# Patient Record
Sex: MALE | Race: OTHER | Employment: FULL TIME | URBAN - METROPOLITAN AREA
[De-identification: names, ages, dates, MRNs, and addresses within clinical notes are randomized per-mention and may not be internally consistent; named-entity substitution may affect disease eponyms.]

---

## 2023-11-16 ENCOUNTER — LAB ENCOUNTER (OUTPATIENT)
Dept: LAB | Facility: HOSPITAL | Age: 43
End: 2023-11-16
Attending: INTERNAL MEDICINE
Payer: COMMERCIAL

## 2023-11-16 ENCOUNTER — OFFICE VISIT (OUTPATIENT)
Dept: INTERNAL MEDICINE CLINIC | Facility: CLINIC | Age: 43
End: 2023-11-16
Payer: COMMERCIAL

## 2023-11-16 VITALS
OXYGEN SATURATION: 97 % | SYSTOLIC BLOOD PRESSURE: 138 MMHG | HEART RATE: 95 BPM | HEIGHT: 69.5 IN | WEIGHT: 216 LBS | TEMPERATURE: 98 F | BODY MASS INDEX: 31.27 KG/M2 | DIASTOLIC BLOOD PRESSURE: 70 MMHG

## 2023-11-16 DIAGNOSIS — Z13.1 DIABETES MELLITUS SCREENING: ICD-10-CM

## 2023-11-16 DIAGNOSIS — Z00.00 ENCOUNTER FOR PREVENTATIVE ADULT HEALTH CARE EXAMINATION: Primary | ICD-10-CM

## 2023-11-16 DIAGNOSIS — Z00.00 ENCOUNTER FOR PREVENTATIVE ADULT HEALTH CARE EXAMINATION: ICD-10-CM

## 2023-11-16 LAB
ALBUMIN SERPL-MCNC: 4.6 G/DL (ref 3.2–4.8)
ALBUMIN/GLOB SERPL: 1.5 {RATIO} (ref 1–2)
ALP LIVER SERPL-CCNC: 61 U/L
ALT SERPL-CCNC: 41 U/L
ANION GAP SERPL CALC-SCNC: 10 MMOL/L (ref 0–18)
AST SERPL-CCNC: 29 U/L (ref ?–34)
BASOPHILS # BLD AUTO: 0.07 X10(3) UL (ref 0–0.2)
BASOPHILS NFR BLD AUTO: 0.9 %
BILIRUB SERPL-MCNC: 0.3 MG/DL (ref 0.3–1.2)
BUN BLD-MCNC: 16 MG/DL (ref 9–23)
BUN/CREAT SERPL: 16.5 (ref 10–20)
CALCIUM BLD-MCNC: 9.6 MG/DL (ref 8.7–10.4)
CHLORIDE SERPL-SCNC: 103 MMOL/L (ref 98–112)
CHOLEST SERPL-MCNC: 232 MG/DL (ref ?–200)
CO2 SERPL-SCNC: 27 MMOL/L (ref 21–32)
CREAT BLD-MCNC: 0.97 MG/DL
DEPRECATED RDW RBC AUTO: 40.2 FL (ref 35.1–46.3)
EGFRCR SERPLBLD CKD-EPI 2021: 101 ML/MIN/1.73M2 (ref 60–?)
EOSINOPHIL # BLD AUTO: 0.2 X10(3) UL (ref 0–0.7)
EOSINOPHIL NFR BLD AUTO: 2.5 %
ERYTHROCYTE [DISTWIDTH] IN BLOOD BY AUTOMATED COUNT: 12.3 % (ref 11–15)
EST. AVERAGE GLUCOSE BLD GHB EST-MCNC: 134 MG/DL (ref 68–126)
FASTING PATIENT LIPID ANSWER: NO
FASTING STATUS PATIENT QL REPORTED: NO
GLOBULIN PLAS-MCNC: 3.1 G/DL (ref 2.8–4.4)
GLUCOSE BLD-MCNC: 106 MG/DL (ref 70–99)
HBA1C MFR BLD: 6.3 % (ref ?–5.7)
HCT VFR BLD AUTO: 44 %
HDLC SERPL-MCNC: 50 MG/DL (ref 40–59)
HGB BLD-MCNC: 15.2 G/DL
IMM GRANULOCYTES # BLD AUTO: 0.02 X10(3) UL (ref 0–1)
IMM GRANULOCYTES NFR BLD: 0.3 %
LDLC SERPL CALC-MCNC: 116 MG/DL (ref ?–100)
LYMPHOCYTES # BLD AUTO: 2.72 X10(3) UL (ref 1–4)
LYMPHOCYTES NFR BLD AUTO: 34.1 %
MCH RBC QN AUTO: 31.1 PG (ref 26–34)
MCHC RBC AUTO-ENTMCNC: 34.5 G/DL (ref 31–37)
MCV RBC AUTO: 90.2 FL
MONOCYTES # BLD AUTO: 0.64 X10(3) UL (ref 0.1–1)
MONOCYTES NFR BLD AUTO: 8 %
NEUTROPHILS # BLD AUTO: 4.33 X10 (3) UL (ref 1.5–7.7)
NEUTROPHILS # BLD AUTO: 4.33 X10(3) UL (ref 1.5–7.7)
NEUTROPHILS NFR BLD AUTO: 54.2 %
NONHDLC SERPL-MCNC: 182 MG/DL (ref ?–130)
OSMOLALITY SERPL CALC.SUM OF ELEC: 292 MOSM/KG (ref 275–295)
PLATELET # BLD AUTO: 214 10(3)UL (ref 150–450)
POTASSIUM SERPL-SCNC: 4 MMOL/L (ref 3.5–5.1)
PROT SERPL-MCNC: 7.7 G/DL (ref 5.7–8.2)
RBC # BLD AUTO: 4.88 X10(6)UL
SODIUM SERPL-SCNC: 140 MMOL/L (ref 136–145)
TRIGL SERPL-MCNC: 381 MG/DL (ref 30–149)
VLDLC SERPL CALC-MCNC: 68 MG/DL (ref 0–30)
WBC # BLD AUTO: 8 X10(3) UL (ref 4–11)

## 2023-11-16 PROCEDURE — 80053 COMPREHEN METABOLIC PANEL: CPT

## 2023-11-16 PROCEDURE — 99386 PREV VISIT NEW AGE 40-64: CPT | Performed by: INTERNAL MEDICINE

## 2023-11-16 PROCEDURE — 85025 COMPLETE CBC W/AUTO DIFF WBC: CPT | Performed by: INTERNAL MEDICINE

## 2023-11-16 PROCEDURE — 83036 HEMOGLOBIN GLYCOSYLATED A1C: CPT

## 2023-11-16 PROCEDURE — 3075F SYST BP GE 130 - 139MM HG: CPT | Performed by: INTERNAL MEDICINE

## 2023-11-16 PROCEDURE — 3008F BODY MASS INDEX DOCD: CPT | Performed by: INTERNAL MEDICINE

## 2023-11-16 PROCEDURE — 80061 LIPID PANEL: CPT

## 2023-11-16 PROCEDURE — 3078F DIAST BP <80 MM HG: CPT | Performed by: INTERNAL MEDICINE

## 2023-11-16 PROCEDURE — 36415 COLL VENOUS BLD VENIPUNCTURE: CPT | Performed by: INTERNAL MEDICINE

## 2023-11-16 RX ORDER — HYDROCORTISONE 25 MG/G
1 CREAM TOPICAL 2 TIMES DAILY PRN
Qty: 1 EACH | Refills: 1 | Status: SHIPPED | OUTPATIENT
Start: 2023-11-16

## 2023-11-16 RX ORDER — HYDROCORTISONE ACETATE 25 MG/1
25 SUPPOSITORY RECTAL DAILY PRN
Qty: 10 SUPPOSITORY | Refills: 1 | Status: SHIPPED | OUTPATIENT
Start: 2023-11-16

## 2023-11-16 RX ORDER — HYDROCORTISONE 25 MG/G
1 CREAM TOPICAL 2 TIMES DAILY PRN
Qty: 1 EACH | Refills: 1 | Status: SHIPPED | OUTPATIENT
Start: 2023-11-16 | End: 2023-11-16

## 2023-11-21 DIAGNOSIS — Z13.1 DIABETES MELLITUS SCREENING: Primary | ICD-10-CM

## 2023-11-21 DIAGNOSIS — E78.5 HYPERLIPIDEMIA, UNSPECIFIED HYPERLIPIDEMIA TYPE: ICD-10-CM

## 2023-11-22 ENCOUNTER — TELEPHONE (OUTPATIENT)
Dept: INTERNAL MEDICINE CLINIC | Facility: CLINIC | Age: 43
End: 2023-11-22

## 2023-11-22 NOTE — TELEPHONE ENCOUNTER
LMTCB      ----- Message from Gracie Truong MD sent at 11/21/2023  1:53 PM CST -----  Please inform patient that his labs have been reviewed. His lipid panel was notable for elevated triglycerides as well as LDL. Hemoglobin A1c is 6.3. Please inform him that he is on the verge of being close to diagnosis of diabetes. Kidney and liver function are normal.  Complete blood count normal.    LDL (low-density lipoprotein) sometimes called \"bad\" cholesterol makes up the majority of your body's cholesterol. High levels of LDL raise your risk of cardiovascular disease including heart disease and stroke. Diets high in saturated fats, salts and cholesterol such as fatty meats, processed foods, dairy and cured meats can lead to elevated LDL levels. Triglycerides are a type of fat (lipid) found in your blood. Recommendations to lower triglycerides include avoiding sugars, refined carbohydrates and alcohol. Increasing foods higher in omega-3 such as fish, dark leafy green vegetables. Increase consumption of vegetables, fruits, herbs, nuts, beans and whole grains. Decrease intake of saturated fats, processed foods, meats and sweets. Moderate amounts of dairy, poultry and seafood. I have placed an order for him to repeat lipid panel hemoglobin A1c prior to next visit. We should recheck this within the next 6 months. Please have him focus on diet and lifestyle changes.     PQ

## 2024-08-07 ENCOUNTER — LAB ENCOUNTER (OUTPATIENT)
Dept: LAB | Facility: HOSPITAL | Age: 44
End: 2024-08-07
Attending: INTERNAL MEDICINE
Payer: COMMERCIAL

## 2024-08-07 ENCOUNTER — OFFICE VISIT (OUTPATIENT)
Dept: INTERNAL MEDICINE CLINIC | Facility: CLINIC | Age: 44
End: 2024-08-07
Payer: COMMERCIAL

## 2024-08-07 VITALS
WEIGHT: 216.38 LBS | DIASTOLIC BLOOD PRESSURE: 76 MMHG | HEIGHT: 69.5 IN | SYSTOLIC BLOOD PRESSURE: 112 MMHG | RESPIRATION RATE: 18 BRPM | BODY MASS INDEX: 31.33 KG/M2 | HEART RATE: 77 BPM | OXYGEN SATURATION: 99 %

## 2024-08-07 DIAGNOSIS — K64.9 HEMORRHOIDS, UNSPECIFIED HEMORRHOID TYPE: Primary | ICD-10-CM

## 2024-08-07 DIAGNOSIS — Z00.00 PREVENTATIVE HEALTH CARE: ICD-10-CM

## 2024-08-07 DIAGNOSIS — R73.03 PREDIABETES: ICD-10-CM

## 2024-08-07 LAB
ALBUMIN SERPL-MCNC: 4.8 G/DL (ref 3.2–4.8)
ALBUMIN/GLOB SERPL: 1.5 {RATIO} (ref 1–2)
ALP LIVER SERPL-CCNC: 59 U/L
ALT SERPL-CCNC: 44 U/L
ANION GAP SERPL CALC-SCNC: 9 MMOL/L (ref 0–18)
AST SERPL-CCNC: 36 U/L (ref ?–34)
BASOPHILS # BLD AUTO: 0.04 X10(3) UL (ref 0–0.2)
BASOPHILS NFR BLD AUTO: 0.7 %
BILIRUB SERPL-MCNC: 0.6 MG/DL (ref 0.3–1.2)
BUN BLD-MCNC: 13 MG/DL (ref 9–23)
BUN/CREAT SERPL: 14.8 (ref 10–20)
CALCIUM BLD-MCNC: 9.7 MG/DL (ref 8.7–10.4)
CHLORIDE SERPL-SCNC: 103 MMOL/L (ref 98–112)
CO2 SERPL-SCNC: 27 MMOL/L (ref 21–32)
CREAT BLD-MCNC: 0.88 MG/DL
DEPRECATED RDW RBC AUTO: 39.8 FL (ref 35.1–46.3)
EGFRCR SERPLBLD CKD-EPI 2021: 110 ML/MIN/1.73M2 (ref 60–?)
EOSINOPHIL # BLD AUTO: 0.08 X10(3) UL (ref 0–0.7)
EOSINOPHIL NFR BLD AUTO: 1.4 %
ERYTHROCYTE [DISTWIDTH] IN BLOOD BY AUTOMATED COUNT: 12.1 % (ref 11–15)
EST. AVERAGE GLUCOSE BLD GHB EST-MCNC: 134 MG/DL (ref 68–126)
FASTING STATUS PATIENT QL REPORTED: NO
GLOBULIN PLAS-MCNC: 3.1 G/DL (ref 2–3.5)
GLUCOSE BLD-MCNC: 109 MG/DL (ref 70–99)
HBA1C MFR BLD: 6.3 % (ref ?–5.7)
HCT VFR BLD AUTO: 44.8 %
HGB BLD-MCNC: 15.7 G/DL
IMM GRANULOCYTES # BLD AUTO: 0.01 X10(3) UL (ref 0–1)
IMM GRANULOCYTES NFR BLD: 0.2 %
LYMPHOCYTES # BLD AUTO: 1.82 X10(3) UL (ref 1–4)
LYMPHOCYTES NFR BLD AUTO: 31 %
MCH RBC QN AUTO: 31.5 PG (ref 26–34)
MCHC RBC AUTO-ENTMCNC: 35 G/DL (ref 31–37)
MCV RBC AUTO: 90 FL
MONOCYTES # BLD AUTO: 0.48 X10(3) UL (ref 0.1–1)
MONOCYTES NFR BLD AUTO: 8.2 %
NEUTROPHILS # BLD AUTO: 3.45 X10 (3) UL (ref 1.5–7.7)
NEUTROPHILS # BLD AUTO: 3.45 X10(3) UL (ref 1.5–7.7)
NEUTROPHILS NFR BLD AUTO: 58.5 %
OSMOLALITY SERPL CALC.SUM OF ELEC: 289 MOSM/KG (ref 275–295)
PLATELET # BLD AUTO: 217 10(3)UL (ref 150–450)
POTASSIUM SERPL-SCNC: 3.8 MMOL/L (ref 3.5–5.1)
PROT SERPL-MCNC: 7.9 G/DL (ref 5.7–8.2)
RBC # BLD AUTO: 4.98 X10(6)UL
SODIUM SERPL-SCNC: 139 MMOL/L (ref 136–145)
WBC # BLD AUTO: 5.9 X10(3) UL (ref 4–11)

## 2024-08-07 PROCEDURE — 3008F BODY MASS INDEX DOCD: CPT | Performed by: INTERNAL MEDICINE

## 2024-08-07 PROCEDURE — 83036 HEMOGLOBIN GLYCOSYLATED A1C: CPT | Performed by: INTERNAL MEDICINE

## 2024-08-07 PROCEDURE — 3074F SYST BP LT 130 MM HG: CPT | Performed by: INTERNAL MEDICINE

## 2024-08-07 PROCEDURE — 3078F DIAST BP <80 MM HG: CPT | Performed by: INTERNAL MEDICINE

## 2024-08-07 PROCEDURE — 36415 COLL VENOUS BLD VENIPUNCTURE: CPT | Performed by: INTERNAL MEDICINE

## 2024-08-07 PROCEDURE — 85025 COMPLETE CBC W/AUTO DIFF WBC: CPT | Performed by: INTERNAL MEDICINE

## 2024-08-07 PROCEDURE — 80053 COMPREHEN METABOLIC PANEL: CPT

## 2024-08-07 PROCEDURE — 99214 OFFICE O/P EST MOD 30 MIN: CPT | Performed by: INTERNAL MEDICINE

## 2024-08-07 RX ORDER — HYDROCORTISONE 25 MG/G
1 CREAM TOPICAL 2 TIMES DAILY PRN
Qty: 1 EACH | Refills: 1 | Status: SHIPPED | OUTPATIENT
Start: 2024-08-07

## 2024-08-07 RX ORDER — TRIAMCINOLONE ACETONIDE 5 MG/G
1 CREAM TOPICAL 3 TIMES DAILY
Qty: 1 EACH | Refills: 1 | Status: SHIPPED | OUTPATIENT
Start: 2024-08-07

## 2024-08-07 NOTE — PROGRESS NOTES
[facility] part of [facility]  Return Patient Progress Note      HPI:     Chief Complaint   Patient presents with    Follow - Up     Pt is coming in to F/U on Hemorrhoids Pt states that they are still bothering him, Pt also states that he has been feeling dizzy for a month         Syedmarga Sheppard is a 43 year old male presenting for:    Has a significant  has no past medical history on file.    Here for follow up on hemorrhoids.  Had C scope this past January to follow up BRBPR at [facility]SH.  Hemorrhoids both internal and external noted.  He continues to experience discomfort.       Reports dizziness over the past week that improved by drinking more water.        Labs:   CMP:  Lab Results   Component Value Date/Time     11/16/2023 03:04 PM    K 4.0 11/16/2023 03:04 PM     11/16/2023 03:04 PM    CO2 27.0 11/16/2023 03:04 PM    CREATSERUM 0.97 11/16/2023 03:04 PM    CA 9.6 11/16/2023 03:04 PM     (H) 11/16/2023 03:04 PM    TP 7.7 11/16/2023 03:04 PM    ALB 4.6 11/16/2023 03:04 PM    ALKPHO 61 11/16/2023 03:04 PM    AST 29 11/16/2023 03:04 PM    ALT 41 11/16/2023 03:04 PM    BILT 0.3 11/16/2023 03:04 PM        CBC:  Lab Results   Component Value Date    WBC 8.0 11/16/2023    HGB 15.2 11/16/2023    HCT 44.0 11/16/2023    .0 11/16/2023    NEPERCENT 54.2 11/16/2023    LYPERCENT 34.1 11/16/2023    MOPERCENT 8.0 11/16/2023    EOPERCENT 2.5 11/16/2023    BAPERCENT 0.9 11/16/2023    NE 4.33 11/16/2023    LYMABS 2.72 11/16/2023    MOABSO 0.64 11/16/2023    EOABSO 0.20 11/16/2023    BAABSO 0.07 11/16/2023          Hemoglobin A1C, Microalbumin  Lab Results   Component Value Date/Time    A1C 6.3 (H) 11/16/2023 03:04 PM        Lipid panel  Lab Results   Component Value Date/Time    CHOLEST 232 (H) 11/16/2023 03:04 PM    HDL 50 11/16/2023 03:04 PM    TRIG 381 (H) 11/16/2023 03:04 PM     (H) 11/16/2023 03:04 PM    NONHDLC 182 (H) 11/16/2023 03:04 PM        Medications:  Current Outpatient  Medications   Medication Sig Dispense Refill    hydrocortisone (ANUSOL-HC) 2.5 % External Cream Place 1 Application rectally 2 (two) times daily as needed. 1 each 1    hydrocortisone (ANUSOL-HC) 25 MG Rectal Suppos Place 1 suppository (25 mg total) rectally daily as needed for Hemorrhoids. (Patient not taking: Reported on 8/7/2024) 10 suppository 1      PMH:  History reviewed. No pertinent past medical history.      PSH:  History reviewed. No pertinent surgical history.    Allergies:  No Known Allergies   Social History:  Social History     Socioeconomic History    Marital status:    Tobacco Use    Smoking status: Never     Passive exposure: Never    Smokeless tobacco: Never   Vaping Use    Vaping status: Every Day    Substances: Nicotine, Flavoring    Devices: Disposable   Substance and Sexual Activity    Alcohol use: Yes    Drug use: Never      Family History:  Family History   Problem Relation Age of Onset    No Known Problems Father     Hypertension Mother     No Known Problems Daughter     No Known Problems Son               REVIEW OF SYSTEMS:   Review of Systems   Constitutional:  Negative for chills, fatigue, fever and unexpected weight change.   HENT:  Negative for congestion, ear pain, hearing loss, rhinorrhea, sinus pain and sore throat.    Eyes:  Negative for pain, redness and visual disturbance.   Respiratory:  Negative for apnea, cough, chest tightness, shortness of breath and wheezing.    Cardiovascular:  Negative for chest pain, palpitations and leg swelling.   Gastrointestinal:  Negative for abdominal distention, abdominal pain, blood in stool, constipation and nausea.        Hemorrhoids   Endocrine: Negative for cold intolerance, heat intolerance and polyuria.   Genitourinary:  Negative for dysuria, hematuria and urgency.   Musculoskeletal:  Negative for arthralgias, back pain, gait problem, joint swelling, myalgias and neck pain.   Skin:  Negative for rash and wound.   Allergic/Immunologic:  Negative for food allergies and immunocompromised state.   Neurological:  Negative for dizziness, seizures, facial asymmetry, speech difficulty, weakness, light-headedness, numbness and headaches.   Hematological:  Negative for adenopathy. Does not bruise/bleed easily.   Psychiatric/Behavioral:  Negative for behavioral problems, sleep disturbance and suicidal ideas. The patient is not nervous/anxious.             PHYSICAL EXAM:   /76   Pulse 77   Resp 18   Ht 5' 9.5\" (1.765 m)   Wt 216 lb 6.4 oz (98.2 kg)   SpO2 99%   BMI 31.50 kg/m²  Estimated body mass index is 31.5 kg/m² as calculated from the following:    Height as of this encounter: 5' 9.5\" (1.765 m).    Weight as of this encounter: 216 lb 6.4 oz (98.2 kg).     Wt Readings from Last 3 Encounters:   08/07/24 216 lb 6.4 oz (98.2 kg)   11/16/23 216 lb (98 kg)       Physical Exam  Vitals reviewed.   Constitutional:       General: He is not in acute distress.     Appearance: He is well-developed.   HENT:      Head: Normocephalic and atraumatic.   Eyes:      Conjunctiva/sclera: Conjunctivae normal.      Pupils: Pupils are equal, round, and reactive to light.   Neck:      Thyroid: No thyromegaly.   Cardiovascular:      Rate and Rhythm: Normal rate and regular rhythm.      Heart sounds: Normal heart sounds, S1 normal and S2 normal. No murmur heard.     No friction rub. No gallop.   Pulmonary:      Effort: Pulmonary effort is normal. No respiratory distress.      Breath sounds: Normal breath sounds. No wheezing or rales.   Chest:      Chest wall: No tenderness.   Abdominal:      General: Bowel sounds are normal. There is no distension.      Palpations: Abdomen is soft. There is no mass.      Tenderness: There is no abdominal tenderness. There is no guarding or rebound.   Musculoskeletal:         General: No tenderness. Normal range of motion.      Cervical back: Normal range of motion.   Lymphadenopathy:      Cervical: No cervical adenopathy.   Skin:      General: Skin is warm.      Findings: No erythema or rash.   Neurological:      Mental Status: He is alert and oriented to person, place, and time.      Cranial Nerves: No cranial nerve deficit.      Deep Tendon Reflexes: Reflexes are normal and symmetric.   Psychiatric:         Behavior: Behavior normal.         Thought Content: Thought content normal.         Judgment: Judgment normal.               ASSESSMENT AND PLAN:   Patient is a 43 year old male who presents primarily presents for:    (K64.9) Hemorrhoids, unspecified hemorrhoid type  (primary encounter diagnosis)  Plan: Surgery Referral - In Network        Anusol refilled.      (Z00.00) Preventative health care  Plan: CBC With Differential With Platelet, Comp         Metabolic Panel (14)            (R73.03) Prediabetes  Plan: Hemoglobin A1C                    Health Maintenance:    Health Maintenance   Topic Date Due    DTaP,Tdap,and Td Vaccines (1 - Tdap) Never done    COVID-19 Vaccine (1 - 2023-24 season) Never done    Annual Depression Screening  Never done    Tobacco Cessation Counseling  Never done    Influenza Vaccine (1) 10/01/2024    Annual Physical  11/16/2024    Pneumococcal Vaccine: Birth to 64yrs  Aged Out         Meds & Refills for this Visit:  Requested Prescriptions      No prescriptions requested or ordered in this encounter       No orders of the defined types were placed in this encounter.      Imaging & Consults:  None          No follow-ups on file.  Important follow up notes/labs for next visit      Patient indicates understanding of the above recommendations and agrees to the above plan.  Reasurrance and education provided. All questions answered.    Notified to call with any questions, complications, allergies, or worsening or changing symptoms as well as any side effects or complications from the treatments .  Red flags/ ER precautions discussed.    If diagnostic labs or imaging ordered advised patient to contact my office for results   24-48 hours after completion    This note was dictated using dragon speech recognition transcription software.  Typographical and transcription errors may be present.  Please call if any questions.             Christiano Chance MD  [facility]

## 2024-08-20 ENCOUNTER — TELEPHONE (OUTPATIENT)
Dept: INTERNAL MEDICINE CLINIC | Facility: CLINIC | Age: 44
End: 2024-08-20

## 2024-08-20 NOTE — TELEPHONE ENCOUNTER
Please inform that his labs have been reviewed.  Hemoglobin A1c is still at 6.3.  In the prediabetic range.  He had an A1c checked at an outside clinic in January with a decrease of 5.9.  It is now increased again.  Continue to focus on healthy diet.  Follow-up in 6 months and we will repeat this lab analysis.  Rest of labs normal.

## 2024-09-27 ENCOUNTER — HOSPITAL ENCOUNTER (EMERGENCY)
Facility: HOSPITAL | Age: 44
Discharge: HOME | End: 2024-09-27
Attending: EMERGENCY MEDICINE
Payer: COMMERCIAL

## 2024-09-27 ENCOUNTER — APPOINTMENT (OUTPATIENT)
Dept: GENERAL RADIOLOGY | Facility: HOSPITAL | Age: 44
End: 2024-09-27
Payer: COMMERCIAL

## 2024-09-27 ENCOUNTER — APPOINTMENT (OUTPATIENT)
Dept: CT IMAGING | Facility: HOSPITAL | Age: 44
End: 2024-09-27
Attending: EMERGENCY MEDICINE
Payer: COMMERCIAL

## 2024-09-27 VITALS
OXYGEN SATURATION: 96 % | SYSTOLIC BLOOD PRESSURE: 134 MMHG | HEART RATE: 61 BPM | DIASTOLIC BLOOD PRESSURE: 92 MMHG | RESPIRATION RATE: 19 BRPM | TEMPERATURE: 99 F

## 2024-09-27 DIAGNOSIS — R20.0 NUMBNESS AND TINGLING: Primary | ICD-10-CM

## 2024-09-27 DIAGNOSIS — R20.2 NUMBNESS AND TINGLING: Primary | ICD-10-CM

## 2024-09-27 LAB
ALBUMIN SERPL-MCNC: 4.7 G/DL (ref 3.2–4.8)
ALBUMIN/GLOB SERPL: 1.5 {RATIO} (ref 1–2)
ALP LIVER SERPL-CCNC: 57 U/L
ALT SERPL-CCNC: 34 U/L
ANION GAP SERPL CALC-SCNC: 8 MMOL/L (ref 0–18)
AST SERPL-CCNC: 27 U/L (ref ?–34)
BASOPHILS # BLD AUTO: 0.05 X10(3) UL (ref 0–0.2)
BASOPHILS NFR BLD AUTO: 0.6 %
BILIRUB SERPL-MCNC: 0.4 MG/DL (ref 0.3–1.2)
BUN BLD-MCNC: 23 MG/DL (ref 9–23)
BUN/CREAT SERPL: 22.1 (ref 10–20)
CALCIUM BLD-MCNC: 9.6 MG/DL (ref 8.7–10.4)
CHLORIDE SERPL-SCNC: 107 MMOL/L (ref 98–112)
CO2 SERPL-SCNC: 26 MMOL/L (ref 21–32)
CREAT BLD-MCNC: 1.04 MG/DL
DEPRECATED RDW RBC AUTO: 41.2 FL (ref 35.1–46.3)
EGFRCR SERPLBLD CKD-EPI 2021: 92 ML/MIN/1.73M2 (ref 60–?)
EOSINOPHIL # BLD AUTO: 0.1 X10(3) UL (ref 0–0.7)
EOSINOPHIL NFR BLD AUTO: 1.2 %
ERYTHROCYTE [DISTWIDTH] IN BLOOD BY AUTOMATED COUNT: 12.7 % (ref 11–15)
GLOBULIN PLAS-MCNC: 3.1 G/DL (ref 2–3.5)
GLUCOSE BLD-MCNC: 103 MG/DL (ref 70–99)
HCT VFR BLD AUTO: 43.9 %
HGB BLD-MCNC: 15.4 G/DL
IMM GRANULOCYTES # BLD AUTO: 0.02 X10(3) UL (ref 0–1)
IMM GRANULOCYTES NFR BLD: 0.2 %
LYMPHOCYTES # BLD AUTO: 2.72 X10(3) UL (ref 1–4)
LYMPHOCYTES NFR BLD AUTO: 33.9 %
MCH RBC QN AUTO: 31.4 PG (ref 26–34)
MCHC RBC AUTO-ENTMCNC: 35.1 G/DL (ref 31–37)
MCV RBC AUTO: 89.6 FL
MONOCYTES # BLD AUTO: 0.7 X10(3) UL (ref 0.1–1)
MONOCYTES NFR BLD AUTO: 8.7 %
NEUTROPHILS # BLD AUTO: 4.44 X10 (3) UL (ref 1.5–7.7)
NEUTROPHILS # BLD AUTO: 4.44 X10(3) UL (ref 1.5–7.7)
NEUTROPHILS NFR BLD AUTO: 55.4 %
OSMOLALITY SERPL CALC.SUM OF ELEC: 296 MOSM/KG (ref 275–295)
PLATELET # BLD AUTO: 218 10(3)UL (ref 150–450)
POTASSIUM SERPL-SCNC: 3.8 MMOL/L (ref 3.5–5.1)
PROT SERPL-MCNC: 7.8 G/DL (ref 5.7–8.2)
RBC # BLD AUTO: 4.9 X10(6)UL
SODIUM SERPL-SCNC: 141 MMOL/L (ref 136–145)
TROPONIN I SERPL HS-MCNC: 4 NG/L
WBC # BLD AUTO: 8 X10(3) UL (ref 4–11)

## 2024-09-27 PROCEDURE — 85025 COMPLETE CBC W/AUTO DIFF WBC: CPT | Performed by: EMERGENCY MEDICINE

## 2024-09-27 PROCEDURE — 99285 EMERGENCY DEPT VISIT HI MDM: CPT

## 2024-09-27 PROCEDURE — 71045 X-RAY EXAM CHEST 1 VIEW: CPT | Performed by: EMERGENCY MEDICINE

## 2024-09-27 PROCEDURE — 36415 COLL VENOUS BLD VENIPUNCTURE: CPT

## 2024-09-27 PROCEDURE — 84484 ASSAY OF TROPONIN QUANT: CPT | Performed by: EMERGENCY MEDICINE

## 2024-09-27 PROCEDURE — 93010 ELECTROCARDIOGRAM REPORT: CPT

## 2024-09-27 PROCEDURE — 80053 COMPREHEN METABOLIC PANEL: CPT | Performed by: EMERGENCY MEDICINE

## 2024-09-27 PROCEDURE — 70450 CT HEAD/BRAIN W/O DYE: CPT | Performed by: EMERGENCY MEDICINE

## 2024-09-27 PROCEDURE — 93005 ELECTROCARDIOGRAM TRACING: CPT

## 2024-09-27 RX ORDER — METHYLPREDNISOLONE 4 MG
TABLET, DOSE PACK ORAL
Qty: 1 EACH | Refills: 0 | Status: SHIPPED | OUTPATIENT
Start: 2024-09-27

## 2024-09-27 RX ORDER — CYCLOBENZAPRINE HCL 10 MG
10 TABLET ORAL 3 TIMES DAILY PRN
Qty: 20 TABLET | Refills: 0 | Status: SHIPPED | OUTPATIENT
Start: 2024-09-27 | End: 2024-10-04

## 2024-09-27 NOTE — ED INITIAL ASSESSMENT (HPI)
Debora [id number].     Patient states stabbing sensation to his heart and makes him lose his breath. Also complains of headache and numbness to left side of head. States the chest pain and head pain started 3 days ago. For a month he states he would wake with chest. Denies cardiac history. No new swelling. Denies recent travel. Has high blood pressure, supposed to take Lisinopril but he ran out of it.

## 2024-09-27 NOTE — ED PROVIDER NOTES
Patient Seen in: [facility] Emergency Department      History     Chief Complaint   Patient presents with    Chest Pain Angina     Stated Complaint: Headache;Chest Pain    Subjective:   HPI    43-year-old male presents for evaluation for paresthesias to the left side of his face and head and some pinching in his chest for the past 3 days.  He denies any fevers, chills, nausea, vomit, earache, sore throat, congestion or shortness of breath, abdominal pain, numbness weakness or tingling to the extremities.  Symptoms radiate up from the neck into the head.    Objective:   History reviewed. No pertinent past medical history.           History reviewed. No pertinent surgical history.             Social History     Socioeconomic History    Marital status:    Tobacco Use    Smoking status: Former     Types: Cigarettes     Passive exposure: Never (quit 8/2024)    Smokeless tobacco: Never   Vaping Use    Vaping status: Every Day    Substances: Nicotine, Flavoring    Devices: Disposable   Substance and Sexual Activity    Alcohol use: Yes    Drug use: Never              Review of Systems    Positive for stated Chief Complaint: Chest Pain Angina    Other systems are as noted in HPI.  Constitutional and vital signs reviewed.      All other systems reviewed and negative except as noted above.    Physical Exam     ED Triage Vitals [09/27/24 1457]   BP (!) 157/91   Pulse 84   Resp 18   Temp 98.9 °F (37.2 °C)   Temp src Temporal   SpO2 97 %   O2 Device None (Room air)       Current Vitals:   Vital Signs  BP: (!) 134/92  Pulse: 61  Resp: 19  Temp: 98.9 °F (37.2 °C)  Temp src: Temporal  MAP (mmHg): (!) 105    Oxygen Therapy  SpO2: 96 %  O2 Device: None (Room air)            Physical Exam  Vitals and nursing note reviewed.   Constitutional:       General: He is not in acute distress.     Appearance: Normal appearance.   HENT:      Head: Normocephalic and atraumatic.      Jaw: No trismus.      Right Ear: Tympanic  membrane normal. No mastoid tenderness.      Left Ear: Tympanic membrane normal. No mastoid tenderness.      Nose: Nose normal. No nasal deformity.      Right Nostril: No epistaxis.      Left Nostril: No epistaxis.      Mouth/Throat:      Lips: Pink.      Mouth: Mucous membranes are moist. No angioedema.      Pharynx: Oropharynx is clear. Uvula midline. No oropharyngeal exudate, posterior oropharyngeal erythema or uvula swelling.      Tonsils: No tonsillar exudate or tonsillar abscesses.   Eyes:      General: Lids are normal.      Extraocular Movements: Extraocular movements intact.      Conjunctiva/sclera: Conjunctivae normal.      Pupils: Pupils are equal, round, and reactive to light.   Cardiovascular:      Rate and Rhythm: Normal rate and regular rhythm.      Pulses: Normal pulses.      Heart sounds: Normal heart sounds.   Pulmonary:      Effort: Pulmonary effort is normal. No respiratory distress.      Breath sounds: Normal breath sounds and air entry.   Musculoskeletal:         General: No deformity. Normal range of motion.      Cervical back: Normal range of motion. No rigidity.   Skin:     General: Skin is warm and dry.      Coloration: Skin is not cyanotic.   Neurological:      General: No focal deficit present.      Mental Status: He is alert. Mental status is at baseline.      Cranial Nerves: No cranial nerve deficit, dysarthria or facial asymmetry.      Sensory: Sensation is intact.      Motor: Motor function is intact.      Coordination: Coordination is intact.      Gait: Gait is intact.   Psychiatric:         Attention and Perception: Attention normal.         Mood and Affect: Mood normal.         Speech: Speech normal.               ED Course     Labs Reviewed   COMP METABOLIC PANEL (14) - Abnormal; Notable for the following components:       Result Value    Glucose 103 (*)     BUN/CREA Ratio 22.1 (*)     Calculated Osmolality 296 (*)     All other components within normal limits   TROPONIN I HIGH  SENSITIVITY - Normal   CBC WITH DIFFERENTIAL WITH PLATELET   RAINBOW DRAW LAVENDER   RAINBOW DRAW LIGHT GREEN   RAINBOW DRAW BLUE     EKG    Rate, intervals and axes as noted on EKG Report.  Rate: 76  Rhythm: Sinus Rhythm  Reading: no stemi, interpreted by myself.               ED Course as of 09/27/24 1834  ------------------------------------------------------------  Time: 09/27 1656  Value: XR CHEST AP PORTABLE  (CPT=71045)  Comment: Per my independent interpretation, patient's CXR demonstrates no pneumonia.    ------------------------------------------------------------  Time: 09/27 1753  Value: CT BRAIN OR HEAD (CPT=70450)  Comment: Per my independent interpretation, patient's CT Head demonstrates no intracranial hemorrhage.                [facility]            Heart Score:    HEART Score      Title      Criteria Score   Age: <45 Age Score: 0   History: Slightly Suspicious Hx Score: 0     EKG: Normal EKG Score: 0   HTN: No   Hypercholesterolemia: No   Atherosclerosis/PVD: No     DM: No   BMI>30kg/m2: Yes   Smoking: No   Family History: No         Other Risk Factor Score: 1             Lab Results   Component Value Date    TROPHS 4 09/27/2024           HEART Score: 1        Risk of adverse cardiac event is 0.9-1.7%                                         Medical Decision Making  Differential diagnosis includes but is not limited to electrolyte disturbance, ACS, sinusitis, cervical radiculopathy, etc.    CBC CMP were unremarkable.  Troponin normal.  Heart score 1.  EKG without acute ischemic changes, do not suspect ACS.  Head CT negative for any acute intracranial findings.  Symptoms seem consistent with a cervical radiculopathy.  Patient was started on some Flexeril and Medrol Dosepak.  He is PERC negative, PE unlikely.  He has no neuro deficits to suggest CVA. He has to follow-up with his PCP for further evaluation.  Return precautions given.  Language line  used for Pashto interpretation.    Rhythm Strip:  Rate 78 sinus rhythm as interpreted by myself. The cardiac monitor was ordered secondary to the patient's chest pain.     Medical Record Review: I personally reviewed available prior medical records for any recent pertinent discharge summaries, testing, and procedures, and reviewed those reports.    Complicating factors: The patient  has no past medical history on file. and  has no past surgical history on file. that contribute to the medical complexity of this ED evaluation.     Clinical impression as well as any lab results and radiology findings were discussed with the patient and/or caregiver. I personally reviewed all laboratory results and radiology images myself. Patient is advised to follow up with PCP for reevaluation. I provided discharge instructions and return precautions. Patient and/or caregiver voices understanding and agreement with the treatment plan. All questions were addressed and answered.       Problems Addressed:  Numbness and tingling: acute illness or injury    Amount and/or Complexity of Data Reviewed  Labs: ordered. Decision-making details documented in ED Course.  Radiology: ordered and independent interpretation performed. Decision-making details documented in ED Course.  ECG/medicine tests: ordered and independent interpretation performed. Decision-making details documented in ED Course.    Risk  Prescription drug management.        Disposition and Plan     Clinical Impression:  1. Numbness and tingling         Disposition:  Discharge  9/27/2024  5:55 pm    Follow-up:  No follow-up provider specified.        Medications Prescribed:  Discharge Medication List as of 9/27/2024  6:01 PM        START taking these medications    Details   cyclobenzaprine 10 MG Oral Tab Take 1 tablet (10 mg total) by mouth 3 (three) times daily as needed for Muscle spasms., Normal, Disp-20 tablet, R-0      methylPREDNISolone (MEDROL) 4 MG Oral Tablet Therapy Pack Dosepack: take as directed, Normal, Disp-1  each, R-0

## 2024-09-29 LAB
ATRIAL RATE: 76 BPM
P AXIS: 59 DEGREES
P-R INTERVAL: 172 MS
Q-T INTERVAL: 380 MS
QRS DURATION: 82 MS
QTC CALCULATION (BEZET): 427 MS
R AXIS: 75 DEGREES
T AXIS: -4 DEGREES
VENTRICULAR RATE: 76 BPM

## 2024-10-09 ENCOUNTER — OFFICE VISIT (OUTPATIENT)
Dept: INTERNAL MEDICINE CLINIC | Facility: CLINIC | Age: 44
End: 2024-10-09
Payer: COMMERCIAL

## 2024-10-09 VITALS
HEIGHT: 69 IN | WEIGHT: 210 LBS | DIASTOLIC BLOOD PRESSURE: 82 MMHG | BODY MASS INDEX: 31.1 KG/M2 | HEART RATE: 77 BPM | OXYGEN SATURATION: 96 % | SYSTOLIC BLOOD PRESSURE: 130 MMHG

## 2024-10-09 DIAGNOSIS — E78.5 HYPERLIPIDEMIA, UNSPECIFIED HYPERLIPIDEMIA TYPE: ICD-10-CM

## 2024-10-09 DIAGNOSIS — R51.9 NEW ONSET OF HEADACHES: Primary | ICD-10-CM

## 2024-10-09 DIAGNOSIS — R73.03 PREDIABETES: ICD-10-CM

## 2024-10-09 PROCEDURE — 3079F DIAST BP 80-89 MM HG: CPT | Performed by: INTERNAL MEDICINE

## 2024-10-09 PROCEDURE — 99214 OFFICE O/P EST MOD 30 MIN: CPT | Performed by: INTERNAL MEDICINE

## 2024-10-09 PROCEDURE — 3008F BODY MASS INDEX DOCD: CPT | Performed by: INTERNAL MEDICINE

## 2024-10-09 PROCEDURE — 3075F SYST BP GE 130 - 139MM HG: CPT | Performed by: INTERNAL MEDICINE

## 2024-10-09 RX ORDER — CYCLOBENZAPRINE HCL 10 MG
10 TABLET ORAL 3 TIMES DAILY PRN
COMMUNITY

## 2024-10-09 RX ORDER — GABAPENTIN 300 MG/1
300 CAPSULE ORAL NIGHTLY
Qty: 30 CAPSULE | Refills: 1 | Status: SHIPPED | OUTPATIENT
Start: 2024-10-09

## 2024-10-09 NOTE — PROGRESS NOTES
[facility] part of [facility]  Return Patient Progress Note      HPI:     Chief Complaint   Patient presents with    Follow - Up     Numbness and tingling face and head        Syed Sheppard is a 43 year old male presenting for:    Has a significant  has no past medical history on file.    Recent ED eval for left sided headache and numbness.  CT brain unremarkable.  He reports getting this sensation during times of stress.        Labs:   CMP:  Lab Results   Component Value Date/Time     09/27/2024 03:35 PM    K 3.8 09/27/2024 03:35 PM     09/27/2024 03:35 PM    CO2 26.0 09/27/2024 03:35 PM    CREATSERUM 1.04 09/27/2024 03:35 PM    CA 9.6 09/27/2024 03:35 PM     (H) 09/27/2024 03:35 PM    TP 7.8 09/27/2024 03:35 PM    ALB 4.7 09/27/2024 03:35 PM    ALKPHO 57 09/27/2024 03:35 PM    AST 27 09/27/2024 03:35 PM    ALT 34 09/27/2024 03:35 PM    BILT 0.4 09/27/2024 03:35 PM        CBC:  Lab Results   Component Value Date    WBC 8.0 09/27/2024    HGB 15.4 09/27/2024    HCT 43.9 09/27/2024    .0 09/27/2024    NEPERCENT 55.4 09/27/2024    LYPERCENT 33.9 09/27/2024    MOPERCENT 8.7 09/27/2024    EOPERCENT 1.2 09/27/2024    BAPERCENT 0.6 09/27/2024    NE 4.44 09/27/2024    LYMABS 2.72 09/27/2024    MOABSO 0.70 09/27/2024    EOABSO 0.10 09/27/2024    BAABSO 0.05 09/27/2024          Hemoglobin A1C, Microalbumin  Lab Results   Component Value Date/Time    A1C 6.3 (H) 08/07/2024 09:31 AM        Lipid panel  Lab Results   Component Value Date/Time    CHOLEST 232 (H) 11/16/2023 03:04 PM    HDL 50 11/16/2023 03:04 PM    TRIG 381 (H) 11/16/2023 03:04 PM     (H) 11/16/2023 03:04 PM    NONHDLC 182 (H) 11/16/2023 03:04 PM        Medications:  Current Outpatient Medications   Medication Sig Dispense Refill    cyclobenzaprine 10 MG Oral Tab Take 1 tablet (10 mg total) by mouth 3 (three) times daily as needed for Muscle spasms.      methylPREDNISolone (MEDROL) 4 MG Oral Tablet Therapy Pack  Dosepack: take as directed 1 each 0    triamcinolone 0.5 % External Cream Apply 1 Application topically 3 (three) times daily. 1 each 1      PMH:  History reviewed. No pertinent past medical history.            REVIEW OF SYSTEMS:   Review of Systems   Constitutional:  Negative for chills, fatigue, fever and unexpected weight change.   HENT:  Negative for congestion, ear pain, hearing loss, rhinorrhea, sinus pain and sore throat.    Eyes:  Negative for pain, redness and visual disturbance.   Respiratory:  Negative for apnea, cough, chest tightness, shortness of breath and wheezing.    Cardiovascular:  Negative for chest pain, palpitations and leg swelling.   Gastrointestinal:  Negative for abdominal distention, abdominal pain, blood in stool, constipation and nausea.   Endocrine: Negative for cold intolerance, heat intolerance and polyuria.   Genitourinary:  Negative for dysuria, hematuria and urgency.   Musculoskeletal:  Negative for arthralgias, back pain, gait problem, joint swelling, myalgias and neck pain.   Skin:  Negative for rash and wound.   Allergic/Immunologic: Negative for food allergies and immunocompromised state.   Neurological:  Positive for numbness and headaches. Negative for dizziness, seizures, facial asymmetry, speech difficulty, weakness and light-headedness.   Hematological:  Negative for adenopathy. Does not bruise/bleed easily.   Psychiatric/Behavioral:  Negative for behavioral problems, sleep disturbance and suicidal ideas. The patient is not nervous/anxious.             PHYSICAL EXAM:   /82   Pulse 77   Ht 5' 9\" (1.753 m)   Wt 210 lb (95.3 kg)   SpO2 96%   BMI 31.01 kg/m²  Estimated body mass index is 31.01 kg/m² as calculated from the following:    Height as of this encounter: 5' 9\" (1.753 m).    Weight as of this encounter: 210 lb (95.3 kg).     Wt Readings from Last 3 Encounters:   10/09/24 210 lb (95.3 kg)   08/07/24 216 lb 6.4 oz (98.2 kg)   11/16/23 216 lb (98 kg)        Physical Exam  Vitals reviewed.   Constitutional:       General: He is not in acute distress.     Appearance: He is well-developed.   HENT:      Head: Normocephalic and atraumatic.   Eyes:      Conjunctiva/sclera: Conjunctivae normal.      Pupils: Pupils are equal, round, and reactive to light.   Neck:      Thyroid: No thyromegaly.   Cardiovascular:      Rate and Rhythm: Normal rate and regular rhythm.      Heart sounds: Normal heart sounds, S1 normal and S2 normal. No murmur heard.     No friction rub. No gallop.   Pulmonary:      Effort: Pulmonary effort is normal. No respiratory distress.      Breath sounds: Normal breath sounds. No wheezing or rales.   Chest:      Chest wall: No tenderness.   Abdominal:      General: Bowel sounds are normal. There is no distension.      Palpations: Abdomen is soft. There is no mass.      Tenderness: There is no abdominal tenderness. There is no guarding or rebound.   Musculoskeletal:         General: No tenderness. Normal range of motion.      Cervical back: Normal range of motion.   Lymphadenopathy:      Cervical: No cervical adenopathy.   Skin:     General: Skin is warm.      Findings: No erythema or rash.   Neurological:      Mental Status: He is alert and oriented to person, place, and time.      Cranial Nerves: No cranial nerve deficit.      Deep Tendon Reflexes: Reflexes are normal and symmetric.   Psychiatric:         Behavior: Behavior normal.         Thought Content: Thought content normal.         Judgment: Judgment normal.               ASSESSMENT AND PLAN:   Patient is a 43 year old male who presents primarily presents for:    (R51.9) New onset of headaches  (primary encounter diagnosis)  CT brain negative.  I was concerned for possible trigemina neuralgia, however, his symptoms are not in the distribution of the trigeminal nerve.  Will trial gabapentin.      (E78.5) Hyperlipidemia, unspecified hyperlipidemia type  Plan: Lipid Panel        To be done prior to  next visit      (R73.03) Prediabetes  Plan: Hemoglobin A1C (Glycohemoglobin) [E]        To be done prior to next visit.                Health Maintenance:    Health Maintenance   Topic Date Due    DTaP,Tdap,and Td Vaccines (1 - Tdap) Never done    Annual Depression Screening  Never done    COVID-19 Vaccine (1 - 2023-24 season) Never done    Influenza Vaccine (1) Never done    Annual Physical  11/16/2024    Pneumococcal Vaccine: Birth to 64yrs  Aged Out         Meds & Refills for this Visit:  Requested Prescriptions      No prescriptions requested or ordered in this encounter       No orders of the defined types were placed in this encounter.      Imaging & Consults:  None          No follow-ups on file.  Important follow up notes/labs for next visit      Patient indicates understanding of the above recommendations and agrees to the above plan.  Reasurrance and education provided. All questions answered.    Notified to call with any questions, complications, allergies, or worsening or changing symptoms as well as any side effects or complications from the treatments .  Red flags/ ER precautions discussed.    If diagnostic labs or imaging ordered advised patient to contact my office for results  24-48 hours after completion    This note was dictated using dragon speech recognition transcription software.  Typographical and transcription errors may be present.  Please call if any questions.             Christiano Chance MD  [facility]

## 2024-12-04 ENCOUNTER — HOSPITAL ENCOUNTER (OUTPATIENT)
Dept: GENERAL RADIOLOGY | Facility: HOSPITAL | Age: 44
Discharge: HOME | End: 2024-12-04
Attending: INTERNAL MEDICINE
Payer: COMMERCIAL

## 2024-12-04 ENCOUNTER — OFFICE VISIT (OUTPATIENT)
Dept: INTERNAL MEDICINE CLINIC | Facility: CLINIC | Age: 44
End: 2024-12-04
Payer: COMMERCIAL

## 2024-12-04 ENCOUNTER — LAB ENCOUNTER (OUTPATIENT)
Dept: LAB | Facility: HOSPITAL | Age: 44
End: 2024-12-04
Attending: INTERNAL MEDICINE
Payer: COMMERCIAL

## 2024-12-04 VITALS
HEIGHT: 69 IN | HEART RATE: 69 BPM | DIASTOLIC BLOOD PRESSURE: 64 MMHG | SYSTOLIC BLOOD PRESSURE: 112 MMHG | WEIGHT: 198 LBS | OXYGEN SATURATION: 96 % | BODY MASS INDEX: 29.33 KG/M2

## 2024-12-04 DIAGNOSIS — E11.9 CONTROLLED TYPE 2 DIABETES MELLITUS WITHOUT COMPLICATION, WITHOUT LONG-TERM CURRENT USE OF INSULIN (HCC): ICD-10-CM

## 2024-12-04 DIAGNOSIS — M54.2 NECK PAIN: ICD-10-CM

## 2024-12-04 DIAGNOSIS — Z13.89 NEPHROPATHY SCREEN: ICD-10-CM

## 2024-12-04 DIAGNOSIS — R73.03 PREDIABETES: ICD-10-CM

## 2024-12-04 DIAGNOSIS — G44.219 EPISODIC TENSION-TYPE HEADACHE, NOT INTRACTABLE: ICD-10-CM

## 2024-12-04 DIAGNOSIS — Z00.00 ANNUAL PHYSICAL EXAM: Primary | ICD-10-CM

## 2024-12-04 LAB
CHOLEST SERPL-MCNC: 163 MG/DL (ref ?–200)
EST. AVERAGE GLUCOSE BLD GHB EST-MCNC: 140 MG/DL (ref 68–126)
FASTING PATIENT LIPID ANSWER: YES
HBA1C MFR BLD: 6.5 % (ref ?–5.7)
HDLC SERPL-MCNC: 38 MG/DL (ref 40–59)
LDLC SERPL CALC-MCNC: 99 MG/DL (ref ?–100)
NONHDLC SERPL-MCNC: 125 MG/DL (ref ?–130)
TRIGL SERPL-MCNC: 148 MG/DL (ref 30–149)
VLDLC SERPL CALC-MCNC: 25 MG/DL (ref 0–30)

## 2024-12-04 PROCEDURE — 80061 LIPID PANEL: CPT | Performed by: INTERNAL MEDICINE

## 2024-12-04 PROCEDURE — 36415 COLL VENOUS BLD VENIPUNCTURE: CPT | Performed by: INTERNAL MEDICINE

## 2024-12-04 PROCEDURE — 83036 HEMOGLOBIN GLYCOSYLATED A1C: CPT

## 2024-12-04 PROCEDURE — 72050 X-RAY EXAM NECK SPINE 4/5VWS: CPT | Performed by: INTERNAL MEDICINE

## 2024-12-04 RX ORDER — TRIAMCINOLONE ACETONIDE 5 MG/G
1 CREAM TOPICAL 3 TIMES DAILY
Qty: 1 EACH | Refills: 1 | Status: SHIPPED | OUTPATIENT
Start: 2024-12-04

## 2024-12-04 RX ORDER — GABAPENTIN 300 MG/1
600 CAPSULE ORAL NIGHTLY
Qty: 60 CAPSULE | Refills: 1 | Status: SHIPPED | OUTPATIENT
Start: 2024-12-04

## 2024-12-04 RX ORDER — NAPROXEN 500 MG/1
500 TABLET ORAL 2 TIMES DAILY PRN
Qty: 60 TABLET | Refills: 1 | Status: SHIPPED | OUTPATIENT
Start: 2024-12-04

## 2024-12-04 NOTE — PROGRESS NOTES
[facility] part of [facility]  Return Patient Progress Note      HPI:     Chief Complaint   Patient presents with    Physical     Annual physical -     Headache     Headaches x 1 week         Syed Sheppard is a 43 year old male presenting for:    Has a significant  has no past medical history on file.    43-year-old male with no significant past medical history presents for annual physical. Reports new and ongoing symptoms.    - Headaches: Experiencing headaches for 7-10 days. Has a chronic history of headaches.  - Medication: Prescribed gabapentin, which is occasionally effective.  - Associated Symptoms: Reports neck discomfort.  - Management: No trial of NSAIDs for headache management has been conducted.    Patient's headache management plan to be reviewed.    Labs:   CMP:  Lab Results   Component Value Date/Time     09/27/2024 03:35 PM    K 3.8 09/27/2024 03:35 PM     09/27/2024 03:35 PM    CO2 26.0 09/27/2024 03:35 PM    CREATSERUM 1.04 09/27/2024 03:35 PM    CA 9.6 09/27/2024 03:35 PM     (H) 09/27/2024 03:35 PM    TP 7.8 09/27/2024 03:35 PM    ALB 4.7 09/27/2024 03:35 PM    ALKPHO 57 09/27/2024 03:35 PM    AST 27 09/27/2024 03:35 PM    ALT 34 09/27/2024 03:35 PM    BILT 0.4 09/27/2024 03:35 PM        CBC:  Lab Results   Component Value Date    WBC 8.0 09/27/2024    HGB 15.4 09/27/2024    HCT 43.9 09/27/2024    .0 09/27/2024    NEPERCENT 55.4 09/27/2024    LYPERCENT 33.9 09/27/2024    MOPERCENT 8.7 09/27/2024    EOPERCENT 1.2 09/27/2024    BAPERCENT 0.6 09/27/2024    NE 4.44 09/27/2024    LYMABS 2.72 09/27/2024    MOABSO 0.70 09/27/2024    EOABSO 0.10 09/27/2024    BAABSO 0.05 09/27/2024          Hemoglobin A1C, Microalbumin  Lab Results   Component Value Date/Time    A1C 6.3 (H) 08/07/2024 09:31 AM        Lipid panel  Lab Results   Component Value Date/Time    CHOLEST 232 (H) 11/16/2023 03:04 PM    HDL 50 11/16/2023 03:04 PM    TRIG 381 (H) 11/16/2023 03:04  PM     (H) 11/16/2023 03:04 PM    NONHDLC 182 (H) 11/16/2023 03:04 PM        Medications:  Current Outpatient Medications   Medication Sig Dispense Refill    triamcinolone 0.5 % External Cream Apply 1 Application topically 3 (three) times daily. 1 each 1    gabapentin 300 MG Oral Cap Take 1 capsule (300 mg total) by mouth nightly. 30 capsule 1      PMH:  History reviewed. No pertinent past medical history.            REVIEW OF SYSTEMS:   Review of Systems   Constitutional:  Negative for chills, fatigue, fever and unexpected weight change.   HENT:  Negative for congestion, ear pain, hearing loss, rhinorrhea, sinus pain and sore throat.    Eyes:  Negative for pain, redness and visual disturbance.   Respiratory:  Negative for apnea, cough, chest tightness, shortness of breath and wheezing.    Cardiovascular:  Negative for chest pain, palpitations and leg swelling.   Gastrointestinal:  Negative for abdominal distention, abdominal pain, blood in stool, constipation and nausea.   Endocrine: Negative for cold intolerance, heat intolerance and polyuria.   Genitourinary:  Negative for dysuria, hematuria and urgency.   Musculoskeletal:  Negative for arthralgias, back pain, gait problem, joint swelling, myalgias and neck pain.   Skin:  Negative for rash and wound.   Allergic/Immunologic: Negative for food allergies and immunocompromised state.   Neurological:  Negative for dizziness, seizures, facial asymmetry, speech difficulty, weakness, light-headedness, numbness and headaches.   Hematological:  Negative for adenopathy. Does not bruise/bleed easily.   Psychiatric/Behavioral:  Negative for behavioral problems, sleep disturbance and suicidal ideas. The patient is not nervous/anxious.             PHYSICAL EXAM:   /64   Pulse 69   Ht 5' 9\" (1.753 m)   Wt 198 lb (89.8 kg)   SpO2 96%   BMI 29.24 kg/m²  Estimated body mass index is 29.24 kg/m² as calculated from the following:    Height as of this encounter: 5'  9\" (1.753 m).    Weight as of this encounter: 198 lb (89.8 kg).     Wt Readings from Last 3 Encounters:   12/04/24 198 lb (89.8 kg)   10/09/24 210 lb (95.3 kg)   08/07/24 216 lb 6.4 oz (98.2 kg)       Physical Exam  Vitals reviewed.   Constitutional:       General: He is not in acute distress.     Appearance: He is well-developed.   HENT:      Head: Normocephalic and atraumatic.   Eyes:      Conjunctiva/sclera: Conjunctivae normal.      Pupils: Pupils are equal, round, and reactive to light.   Neck:      Thyroid: No thyromegaly.   Cardiovascular:      Rate and Rhythm: Normal rate and regular rhythm.      Heart sounds: Normal heart sounds, S1 normal and S2 normal. No murmur heard.     No friction rub. No gallop.   Pulmonary:      Effort: Pulmonary effort is normal. No respiratory distress.      Breath sounds: Normal breath sounds. No wheezing or rales.   Chest:      Chest wall: No tenderness.   Abdominal:      General: Bowel sounds are normal. There is no distension.      Palpations: Abdomen is soft. There is no mass.      Tenderness: There is no abdominal tenderness. There is no guarding or rebound.   Musculoskeletal:         General: No tenderness. Normal range of motion.      Cervical back: Normal range of motion.   Lymphadenopathy:      Cervical: No cervical adenopathy.   Skin:     General: Skin is warm.      Findings: No erythema or rash.   Neurological:      Mental Status: He is alert and oriented to person, place, and time.      Cranial Nerves: No cranial nerve deficit.      Deep Tendon Reflexes: Reflexes are normal and symmetric.   Psychiatric:         Behavior: Behavior normal.         Thought Content: Thought content normal.         Judgment: Judgment normal.         Bilateral barefoot skin diabetic exam is normal, visualized feet and the appearance is normal.  Bilateral monofilament/sensation of both feet is normal.  Pulsation pedal pulse exam of both lower legs/feet is normal as well.          ASSESSMENT  AND PLAN:   Patient is a 43 year old male who presents primarily presents for:    Assessment and Plan:    1. Annual Physical Exam:  - Discussed age-appropriate screening and preventative labs.  - Ordered A1c and lipid panel due to prediabetes history.    2. Prediabetes:  - Discussed dietary changes and will monitor A1c.    3. Headaches:  - Chronic history, currently on gabapentin with occasional relief.  - Has not tried NSAIDs; prescribed naproxen in addition to gabapentin.    4. Neck pain:  - Prescribed naproxen for pain relief.  - Recommended considering PT.    5. Patient education and f/u:  - Addressed questions and concerns.  - Advised to return if symptoms persist or worsen.        Health Maintenance:    Health Maintenance   Topic Date Due    DTaP,Tdap,and Td Vaccines (1 - Tdap) Never done    COVID-19 Vaccine (1 - 2024-25 season) Never done    Influenza Vaccine (1) Never done    Annual Physical  11/16/2024    Annual Depression Screening  Completed    Pneumococcal Vaccine: Birth to 64yrs  Aged Out         Meds & Refills for this Visit:  Requested Prescriptions     Signed Prescriptions Disp Refills    triamcinolone 0.5 % External Cream 1 each 1     Sig: Apply 1 Application topically 3 (three) times daily.       Orders Placed This Encounter   Procedures    TdaP (Adacel, Boostrix) [05754]       Imaging & Consults:  TETANUS, DIPHTHERIA TOXOIDS AND ACELLULAR PERTUSIS VACCINE (TDAP), >7 YEARS, IM USE          No follow-ups on file.  Important follow up notes/labs for next visit      Patient indicates understanding of the above recommendations and agrees to the above plan.  Reasurrance and education provided. All questions answered.    Notified to call with any questions, complications, allergies, or worsening or changing symptoms as well as any side effects or complications from the treatments .  Red flags/ ER precautions discussed.    If diagnostic labs or imaging ordered advised patient to contact my office for  results  24-48 hours after completion    This note was dictated using dragon speech recognition transcription software.  Typographical and transcription errors may be present.  Please call if any questions.       As part of our commitment to providing you with comprehensive, transparent, and timely access to your health information, we adhere to the guidelines set forth by the 21st Century Cures Act. This Act enhances your rights to access your electronic health information and ensures that you can easily obtain your medical records.  Please note that the verbage used in this note is intended for medical documentation and communication and may be interpreted as forthright.  Please do not hesitate to contact my office if you have any questions.            Christiano Chance MD  [facility]

## 2024-12-09 ENCOUNTER — TELEPHONE (OUTPATIENT)
Dept: INTERNAL MEDICINE CLINIC | Facility: CLINIC | Age: 44
End: 2024-12-09

## 2024-12-09 RX ORDER — ATORVASTATIN CALCIUM 10 MG/1
10 TABLET, FILM COATED ORAL NIGHTLY
Qty: 90 TABLET | Refills: 1 | Status: SHIPPED | OUTPATIENT
Start: 2024-12-09

## 2024-12-09 RX ORDER — METFORMIN HYDROCHLORIDE 500 MG/1
500 TABLET, EXTENDED RELEASE ORAL DAILY
Qty: 90 TABLET | Refills: 1 | Status: SHIPPED | OUTPATIENT
Start: 2024-12-09

## 2024-12-09 NOTE — TELEPHONE ENCOUNTER
Attempted to contact patient, unable to lvm due to a full mailbox.   Xray results and blood work

## 2024-12-09 NOTE — PROGRESS NOTES
Please inform him that his A1C is at 6.5. Now considered to be diabetic.     Needs to schedule with ophthalmology, diabetic educator.  Glucometer sent to pharmacy. Can review with diabetic educator how to properly check glucose levels.      Starting metformin 500 mg ER daily.      Statin atorvastatin 10 mg sent to pharmacy.     Should complete urine microalbumin to creatinine ration at his convenience.     Follow up with me in 3 months.      Dr. Chance

## 2024-12-09 NOTE — TELEPHONE ENCOUNTER
----- Message from Christiano Chance sent at 12/9/2024 10:39 AM CST -----  Please inform him that his A1C is at 6.5. Now considered to be diabetic.     Needs to schedule with ophthalmology, diabetic educator.  Glucometer sent to pharmacy. Can review with diabetic educator how to properly check glucose levels.      Starting metformin 500 mg ER daily.      Statin atorvastatin 10 mg sent to pharmacy.     Should complete urine microalbumin to creatinine ration at his convenience.     Follow up with me in 3 months.      Dr. Chance

## 2024-12-17 NOTE — TELEPHONE ENCOUNTER
Unable to reach patient regarding his test results, a letter was sent through [employer] asking patient to contact our office.

## 2025-02-10 ENCOUNTER — OFFICE VISIT (OUTPATIENT)
Dept: INTERNAL MEDICINE CLINIC | Facility: CLINIC | Age: 45
End: 2025-02-10
Payer: COMMERCIAL

## 2025-02-10 VITALS
BODY MASS INDEX: 31.16 KG/M2 | SYSTOLIC BLOOD PRESSURE: 120 MMHG | HEART RATE: 80 BPM | DIASTOLIC BLOOD PRESSURE: 82 MMHG | OXYGEN SATURATION: 97 % | WEIGHT: 210.38 LBS | HEIGHT: 69 IN | TEMPERATURE: 98 F

## 2025-02-10 DIAGNOSIS — Z13.89 NEPHROPATHY SCREEN: ICD-10-CM

## 2025-02-10 DIAGNOSIS — E11.9 CONTROLLED TYPE 2 DIABETES MELLITUS WITHOUT COMPLICATION, WITHOUT LONG-TERM CURRENT USE OF INSULIN (HCC): Primary | ICD-10-CM

## 2025-02-10 LAB
CREAT UR-SCNC: 238 MG/DL
MICROALBUMIN UR-MCNC: 0.3 MG/DL
MICROALBUMIN/CREAT 24H UR-RTO: 1.3 UG/MG (ref ?–30)

## 2025-02-10 PROCEDURE — 3079F DIAST BP 80-89 MM HG: CPT | Performed by: INTERNAL MEDICINE

## 2025-02-10 PROCEDURE — 3008F BODY MASS INDEX DOCD: CPT | Performed by: INTERNAL MEDICINE

## 2025-02-10 PROCEDURE — 99214 OFFICE O/P EST MOD 30 MIN: CPT | Performed by: INTERNAL MEDICINE

## 2025-02-10 PROCEDURE — 82570 ASSAY OF URINE CREATININE: CPT | Performed by: INTERNAL MEDICINE

## 2025-02-10 PROCEDURE — 3074F SYST BP LT 130 MM HG: CPT | Performed by: INTERNAL MEDICINE

## 2025-02-10 PROCEDURE — 82043 UR ALBUMIN QUANTITATIVE: CPT | Performed by: INTERNAL MEDICINE

## 2025-02-10 NOTE — PROGRESS NOTES
[facility] part of [facility]  Return Patient Progress Note      HPI:     Chief Complaint   Patient presents with    Follow - Up       Syed Sheppard is a 44 year old male presenting for:    Has a significant  has no past medical history on file.    Here to discuss newly diagnosed diabetes.      Labs:   CMP:  Lab Results   Component Value Date/Time     09/27/2024 03:35 PM    K 3.8 09/27/2024 03:35 PM     09/27/2024 03:35 PM    CO2 26.0 09/27/2024 03:35 PM    CREATSERUM 1.04 09/27/2024 03:35 PM    CA 9.6 09/27/2024 03:35 PM     (H) 09/27/2024 03:35 PM    TP 7.8 09/27/2024 03:35 PM    ALB 4.7 09/27/2024 03:35 PM    ALKPHO 57 09/27/2024 03:35 PM    AST 27 09/27/2024 03:35 PM    ALT 34 09/27/2024 03:35 PM    BILT 0.4 09/27/2024 03:35 PM        CBC:  Lab Results   Component Value Date    WBC 8.0 09/27/2024    HGB 15.4 09/27/2024    HCT 43.9 09/27/2024    .0 09/27/2024    NEPERCENT 55.4 09/27/2024    LYPERCENT 33.9 09/27/2024    MOPERCENT 8.7 09/27/2024    EOPERCENT 1.2 09/27/2024    BAPERCENT 0.6 09/27/2024    NE 4.44 09/27/2024    LYMABS 2.72 09/27/2024    MOABSO 0.70 09/27/2024    EOABSO 0.10 09/27/2024    BAABSO 0.05 09/27/2024          Hemoglobin A1C, Microalbumin  Lab Results   Component Value Date/Time    A1C 6.5 (H) 12/04/2024 01:03 PM        Lipid panel  Lab Results   Component Value Date/Time    CHOLEST 163 12/04/2024 01:03 PM    HDL 38 (L) 12/04/2024 01:03 PM    TRIG 148 12/04/2024 01:03 PM    LDL 99 12/04/2024 01:03 PM    NONHDLC 125 12/04/2024 01:03 PM        Medications:  Current Outpatient Medications   Medication Sig Dispense Refill    metFORMIN  MG Oral Tablet 24 Hr Take 1 tablet (500 mg total) by mouth daily. 90 tablet 1    Blood Glucose Monitoring Suppl (D-CARE GLUCOMETER) w/Device Does not apply Kit 1 Application daily. Check glucose daily 1 kit 0    atorvastatin 10 MG Oral Tab Take 1 tablet (10 mg total) by mouth nightly. 90 tablet 1     triamcinolone 0.5 % External Cream Apply 1 Application topically 3 (three) times daily. 1 each 1    naproxen 500 MG Oral Tab Take 1 tablet (500 mg total) by mouth 2 (two) times daily as needed. 60 tablet 1    gabapentin 300 MG Oral Cap Take 2 capsules (600 mg total) by mouth nightly. (Patient not taking: Reported on 2/10/2025) 60 capsule 1      PMH:  History reviewed. No pertinent past medical history.            REVIEW OF SYSTEMS:   Review of Systems   Constitutional:  Negative for chills, fatigue, fever and unexpected weight change.   HENT:  Negative for congestion, ear pain, hearing loss, rhinorrhea, sinus pain and sore throat.    Eyes:  Negative for pain, redness and visual disturbance.   Respiratory:  Negative for apnea, cough, chest tightness, shortness of breath and wheezing.    Cardiovascular:  Negative for chest pain, palpitations and leg swelling.   Gastrointestinal:  Negative for abdominal distention, abdominal pain, blood in stool, constipation and nausea.   Endocrine: Negative for cold intolerance, heat intolerance and polyuria.   Genitourinary:  Negative for dysuria, hematuria and urgency.   Musculoskeletal:  Negative for arthralgias, back pain, gait problem, joint swelling, myalgias and neck pain.   Skin:  Negative for rash and wound.   Allergic/Immunologic: Negative for food allergies and immunocompromised state.   Neurological:  Negative for dizziness, seizures, facial asymmetry, speech difficulty, weakness, light-headedness, numbness and headaches.   Hematological:  Negative for adenopathy. Does not bruise/bleed easily.   Psychiatric/Behavioral:  Negative for behavioral problems, sleep disturbance and suicidal ideas. The patient is not nervous/anxious.             PHYSICAL EXAM:   /82   Pulse 80   Temp 97.7 °F (36.5 °C)   Ht 5' 9\" (1.753 m)   Wt 210 lb 6.4 oz (95.4 kg)   SpO2 97%   BMI 31.07 kg/m²  Estimated body mass index is 31.07 kg/m² as calculated from the following:    Height as  of this encounter: 5' 9\" (1.753 m).    Weight as of this encounter: 210 lb 6.4 oz (95.4 kg).     Wt Readings from Last 3 Encounters:   02/10/25 210 lb 6.4 oz (95.4 kg)   12/04/24 198 lb (89.8 kg)   10/09/24 210 lb (95.3 kg)       Physical Exam  Vitals reviewed.   Constitutional:       General: He is not in acute distress.     Appearance: He is well-developed.   HENT:      Head: Normocephalic and atraumatic.   Eyes:      Conjunctiva/sclera: Conjunctivae normal.      Pupils: Pupils are equal, round, and reactive to light.   Neck:      Thyroid: No thyromegaly.   Cardiovascular:      Rate and Rhythm: Normal rate and regular rhythm.      Heart sounds: Normal heart sounds, S1 normal and S2 normal. No murmur heard.     No friction rub. No gallop.   Pulmonary:      Effort: Pulmonary effort is normal. No respiratory distress.      Breath sounds: Normal breath sounds. No wheezing or rales.   Chest:      Chest wall: No tenderness.   Abdominal:      General: Bowel sounds are normal. There is no distension.      Palpations: Abdomen is soft. There is no mass.      Tenderness: There is no abdominal tenderness. There is no guarding or rebound.   Musculoskeletal:         General: No tenderness. Normal range of motion.      Cervical back: Normal range of motion.   Lymphadenopathy:      Cervical: No cervical adenopathy.   Skin:     General: Skin is warm.      Findings: No erythema or rash.   Neurological:      Mental Status: He is alert and oriented to person, place, and time.      Cranial Nerves: No cranial nerve deficit.      Deep Tendon Reflexes: Reflexes are normal and symmetric.   Psychiatric:         Behavior: Behavior normal.         Thought Content: Thought content normal.         Judgment: Judgment normal.               ASSESSMENT AND PLAN:   Patient is a 44 year old male who presents primarily presents for:    (E11.9) Controlled type 2 diabetes mellitus without complication, without long-term current use of insulin ([facility])   (primary encounter diagnosis)  Plan:\    Metformin  daily  Atorvastatin started.     Will follow up with ophthalmology.     Referral for diabetic educator.      Extensive time spent discussing the risks of uncontrolled diabetes.  Recommendations for control of diabetes including appropriate monitoring of blood glucose, follow-up with specialists (ophthalmologist and podiatrist) and dietary recommendations discussed.      Dietary recommendations include  -  Carbohydrates: Focus on consuming complex carbohydrates like whole grains, legumes, fruits, and vegetables. These foods provide fiber and have a slower impact on blood sugar levels compared to refined carbohydrates.    -  Fiber: Include high-fiber foods such as whole grains, vegetables, fruits, and legumes in your diet. Fiber helps regulate blood sugar levels and promotes overall digestive health.    -  Protein: Incorporate lean sources of protein like poultry, fish, tofu, beans, and low-fat dairy products. Protein helps with satiety and can help stabilize blood sugar levels.    -  Healthy fats: Choose sources of healthy fats like avocados, nuts, seeds, and olive oil. Limit saturated and trans fats found in fried foods, fatty meats, and processed snacks.    - Portion control: Pay attention to portion sizes to manage calorie intake and prevent blood sugar spikes. Consider working with a registered dietitian to determine appropriate portion sizes for your specific needs.    - Regular meals and snacks: Aim for regular meal times and spacing meals evenly throughout the day. This can help maintain stable blood sugar levels.    - Limit sugary foods and beverages: Minimize the consumption of sugary drinks, desserts, and processed snacks as they can cause rapid blood sugar spikes.      Discussed glucose targets:       -  Before a meal 80 to 130 mg/dL       -  Two hours after the start of a meal: Less than 180 mg/dL            (Z13.89) Nephropathy screen  Plan:  Microalb/Creat Ratio, Random Urine                    Health Maintenance:    Health Maintenance   Topic Date Due    Diabetes Care Dilated Eye Exam  Never done    Pneumococcal Vaccine: Birth to 50yrs (1 of 2 - PCV) Never done    COVID-19 Vaccine (1 - 2024-25 season) Never done    Influenza Vaccine (1) Never done    Diabetes Care: Foot Exam (Annual)  01/01/2025    Diabetes Care: Microalb/Creat Ratio (Annual)  Never done    Diabetes Care A1C  06/04/2025    Diabetes Care: GFR  09/27/2025    Annual Physical  12/04/2025    DTaP,Tdap,and Td Vaccines (2 - Td or Tdap) 12/04/2034    Annual Depression Screening  Completed    Meningococcal B Vaccine  Aged Out         Meds & Refills for this Visit:  Requested Prescriptions      No prescriptions requested or ordered in this encounter       No orders of the defined types were placed in this encounter.      Imaging & Consults:  None          Return in about 3 months (around 5/10/2025) for Glucose monitoring.  Important follow up notes/labs for next visit      Patient indicates understanding of the above recommendations and agrees to the above plan.  Reasurrance and education provided. All questions answered.    Notified to call with any questions, complications, allergies, or worsening or changing symptoms as well as any side effects or complications from the treatments .  Red flags/ ER precautions discussed.    If diagnostic labs or imaging ordered advised patient to contact my office for results  24-48 hours after completion    This note was dictated using dragon speech recognition transcription software.  Typographical and transcription errors may be present.  Please call if any questions.       As part of our commitment to providing you with comprehensive, transparent, and timely access to your health information, we adhere to the guidelines set forth by the 21st Century Cures Act. This Act enhances your rights to access your electronic health information and ensures that you  can easily obtain your medical records.  Please note that the verbage used in this note is intended for medical documentation and communication and may be interpreted as forthright.  Please do not hesitate to contact my office if you have any questions.            Christiano Chance MD  [facility]

## 2025-02-11 ENCOUNTER — TELEPHONE (OUTPATIENT)
Age: 45
End: 2025-02-11

## 2025-02-11 NOTE — TELEPHONE ENCOUNTER
Pt called stating that pcp forgot to send his diabetic medication, diabetic machine and cholesterol medication  To please re send prescription to the pharmacy    Per pt, he never picked up the medications sent on December    Please advise   
Spoke with pharmacist Rene and he stated that patient never picked his medication on December, therefore it was put back. They will work on it again and we gave a verbal for test strips and lancets.   
Alert and oriented to person, place and time/Patient baseline mental status/Awake

## 2025-03-28 ENCOUNTER — TELEPHONE (OUTPATIENT)
Age: 45
End: 2025-03-28

## 2025-03-28 NOTE — TELEPHONE ENCOUNTER
Pt called stating that he has been feeling pain and pounding sensation on top of his head, that his scalp feels sensible  Pt would like to see pcp sooner then May    Please advise

## 2025-03-31 ENCOUNTER — NURSE TRIAGE (OUTPATIENT)
Dept: INTERNAL MEDICINE CLINIC | Facility: CLINIC | Age: 45
End: 2025-03-31

## 2025-03-31 NOTE — TELEPHONE ENCOUNTER
Spoke to  Diony # [id number] to  [facility] with headache for 1 week. Patient states \" it feels like something is moving.\" Patient denied head trauma. Patient also has 3/10 upper back pain. Patient taking Naproxen 500 mg. Patient does not know diagnosis of headaches. Review of appointment notes patient was evaluated by Dr. Chance on 12/4/24 the headaches are chronic tension headaches. Patient is taking gabapentin but he does not believe is effective.     Disposition: Today or tomorrow.     RN offered Syed 4/1/25  but it was declined. Patient agreed to appointment 4/2/25 at 2:40 PM. RN informed Syed if he develops severe head pain, dizziness, visual loss, or weakness/numbness of one side of face or body to go immediately to the emergency department. Patient voiced understanding.        Reason for Disposition   Unexplained headache that is present > 24 hours    Answer Assessment - Initial Assessment Questions  1. LOCATION: \"Where does it hurt?\"       Top of your head.   2. ONSET: \"When did the headache start?\" (Minutes, hours or days)       1 week   3. PATTERN: \"Does the pain come and go, or has it been constant since it started?\"      Intermittent   4. SEVERITY: \"How bad is the pain?\" and \"What does it keep you from doing?\"  (e.g., Scale 1-10; mild, moderate, or severe)    - MILD (1-3): doesn't interfere with normal activities     - MODERATE (4-7): interferes with normal activities or awakens from sleep     - SEVERE (8-10): excruciating pain, unable to do any normal activities         4-5/10   5. RECURRENT SYMPTOM: \"Have you ever had headaches before?\" If Yes, ask: \"When was the last time?\" and \"What happened that time?\"         6. CAUSE: \"What do you think is causing the headache?\"      Unknown   7. MIGRAINE: \"Have you been diagnosed with migraine headaches?\" If Yes, ask: \"Is this headache similar?\"       Denied  8. HEAD INJURY: \"Has there been any recent injury to the head?\"       Denied  9. OTHER  SYMPTOMS: \"Do you have any other symptoms?\" (fever, stiff neck, eye pain, sore throat, cold symptoms)      Denied  10. PREGNANCY: \"Is there any chance you are pregnant?\" \"When was your last menstrual period?\"        N/A    Protocols used: Headache-A-OH

## 2025-04-02 ENCOUNTER — OFFICE VISIT (OUTPATIENT)
Age: 45
End: 2025-04-02
Payer: COMMERCIAL

## 2025-04-02 ENCOUNTER — LAB ENCOUNTER (OUTPATIENT)
Dept: LAB | Age: 45
End: 2025-04-02
Attending: INTERNAL MEDICINE
Payer: COMMERCIAL

## 2025-04-02 VITALS
WEIGHT: 187 LBS | HEIGHT: 69 IN | TEMPERATURE: 97 F | SYSTOLIC BLOOD PRESSURE: 110 MMHG | OXYGEN SATURATION: 97 % | HEART RATE: 84 BPM | DIASTOLIC BLOOD PRESSURE: 60 MMHG | BODY MASS INDEX: 27.7 KG/M2

## 2025-04-02 DIAGNOSIS — Z13.89 NEPHROPATHY SCREEN: Primary | ICD-10-CM

## 2025-04-02 DIAGNOSIS — Z13.89 NEPHROPATHY SCREEN: ICD-10-CM

## 2025-04-02 DIAGNOSIS — E11.9 CONTROLLED TYPE 2 DIABETES MELLITUS WITHOUT COMPLICATION, WITHOUT LONG-TERM CURRENT USE OF INSULIN (HCC): ICD-10-CM

## 2025-04-02 DIAGNOSIS — R20.0 ARM NUMBNESS: ICD-10-CM

## 2025-04-02 LAB
CREAT UR-SCNC: 70.5 MG/DL
EST. AVERAGE GLUCOSE BLD GHB EST-MCNC: 126 MG/DL (ref 68–126)
HBA1C MFR BLD: 6 % (ref ?–5.7)
MICROALBUMIN UR-MCNC: <0.3 MG/DL

## 2025-04-02 PROCEDURE — 82570 ASSAY OF URINE CREATININE: CPT

## 2025-04-02 PROCEDURE — 3061F NEG MICROALBUMINURIA REV: CPT | Performed by: INTERNAL MEDICINE

## 2025-04-02 PROCEDURE — 99214 OFFICE O/P EST MOD 30 MIN: CPT | Performed by: INTERNAL MEDICINE

## 2025-04-02 PROCEDURE — 82043 UR ALBUMIN QUANTITATIVE: CPT

## 2025-04-02 PROCEDURE — 3044F HG A1C LEVEL LT 7.0%: CPT | Performed by: INTERNAL MEDICINE

## 2025-04-02 PROCEDURE — 83036 HEMOGLOBIN GLYCOSYLATED A1C: CPT | Performed by: INTERNAL MEDICINE

## 2025-04-02 PROCEDURE — 3074F SYST BP LT 130 MM HG: CPT | Performed by: INTERNAL MEDICINE

## 2025-04-02 PROCEDURE — 3078F DIAST BP <80 MM HG: CPT | Performed by: INTERNAL MEDICINE

## 2025-04-02 PROCEDURE — 3008F BODY MASS INDEX DOCD: CPT | Performed by: INTERNAL MEDICINE

## 2025-04-02 PROCEDURE — 36415 COLL VENOUS BLD VENIPUNCTURE: CPT | Performed by: INTERNAL MEDICINE

## 2025-04-02 RX ORDER — GABAPENTIN 300 MG/1
600 CAPSULE ORAL NIGHTLY
Qty: 60 CAPSULE | Refills: 1 | Status: SHIPPED | OUTPATIENT
Start: 2025-04-02

## 2025-04-02 RX ORDER — TRIAMCINOLONE ACETONIDE 5 MG/G
1 CREAM TOPICAL 3 TIMES DAILY
Qty: 1 EACH | Refills: 1 | Status: SHIPPED | OUTPATIENT
Start: 2025-04-02

## 2025-04-02 NOTE — PROGRESS NOTES
[facility] part of [facility]  Return Patient Progress Note      HPI:     Chief Complaint   Patient presents with    Headache    Back Pain     Upper back shocking waves left side        Syed Sheppard is a 44 year old male presenting for:    Has a significant  has no past medical history on file.    44-year-old male with history of controlled type 2 diabetes and hyperlipidemia presents for follow-up. Reports significant weight loss of 20-25 pounds since last visit through dietary modifications, including reduced carbohydrate intake. Patient has cut down on tortillas, pasta, rice, and alcohol consumption. Now weighs 188 pounds, down from 210 pounds previously. Reports normal blood sugar levels, with last check on March 31st showing normal results.    - Intermittent head pain  - Sensation of electricity in palm  - Pain in middle, more pronounced on left side  - Occasional tightness in neck  - Spots on back of leg  - Takes gabapentin in morning and evening    Medical History:  - Type 2 Diabetes (controlled)  - Hyperlipidemia    Current Medications:  - Metformin  - Gabapentin (morning and evening)    Social History:  - Diet: Reduced carbohydrate intake, cut down on tortillas, pasta, rice. Eats healthier options like lentils and nopales with eggs after 7 PM  - Substance use: Stopped drinking alcohol. Drinks water and caffeinated coffee without sugar  - Exercise: Not specified, but has lost 20-25 pounds since last visit through diet changes    Review of Systems:  - General: Feeling better  - Neurological: Intermittent head pain, sensation of electricity in palm, pain in middle (more on left side), tightness in neck  - Dermatological: Spots on back of leg      Labs:   CMP:  Lab Results   Component Value Date/Time     09/27/2024 03:35 PM    K 3.8 09/27/2024 03:35 PM     09/27/2024 03:35 PM    CO2 26.0 09/27/2024 03:35 PM    CREATSERUM 1.04 09/27/2024 03:35 PM    CA 9.6 09/27/2024 03:35  PM     (H) 09/27/2024 03:35 PM    TP 7.8 09/27/2024 03:35 PM    ALB 4.7 09/27/2024 03:35 PM    ALKPHO 57 09/27/2024 03:35 PM    AST 27 09/27/2024 03:35 PM    ALT 34 09/27/2024 03:35 PM    BILT 0.4 09/27/2024 03:35 PM        CBC:  Lab Results   Component Value Date    WBC 8.0 09/27/2024    HGB 15.4 09/27/2024    HCT 43.9 09/27/2024    .0 09/27/2024    NEPERCENT 55.4 09/27/2024    LYPERCENT 33.9 09/27/2024    MOPERCENT 8.7 09/27/2024    EOPERCENT 1.2 09/27/2024    BAPERCENT 0.6 09/27/2024    NE 4.44 09/27/2024    LYMABS 2.72 09/27/2024    MOABSO 0.70 09/27/2024    EOABSO 0.10 09/27/2024    BAABSO 0.05 09/27/2024          Hemoglobin A1C, Microalbumin  Lab Results   Component Value Date/Time    A1C 6.5 (H) 12/04/2024 01:03 PM        Lipid panel  Lab Results   Component Value Date/Time    CHOLEST 163 12/04/2024 01:03 PM    HDL 38 (L) 12/04/2024 01:03 PM    TRIG 148 12/04/2024 01:03 PM    LDL 99 12/04/2024 01:03 PM    NONHDLC 125 12/04/2024 01:03 PM        Medications:  Current Outpatient Medications   Medication Sig Dispense Refill    metFORMIN  MG Oral Tablet 24 Hr Take 1 tablet (500 mg total) by mouth daily. 90 tablet 1    Blood Glucose Monitoring Suppl (D-CARE GLUCOMETER) w/Device Does not apply Kit 1 Application daily. Check glucose daily 1 kit 0    atorvastatin 10 MG Oral Tab Take 1 tablet (10 mg total) by mouth nightly. 90 tablet 1    triamcinolone 0.5 % External Cream Apply 1 Application topically 3 (three) times daily. 1 each 1    gabapentin 300 MG Oral Cap Take 2 capsules (600 mg total) by mouth nightly. (Patient not taking: Reported on 2/10/2025) 60 capsule 1    naproxen 500 MG Oral Tab Take 1 tablet (500 mg total) by mouth 2 (two) times daily as needed. 60 tablet 1      PMH:  History reviewed. No pertinent past medical history.            REVIEW OF SYSTEMS:   Review of Systems   Constitutional:  Negative for chills, fatigue, fever and unexpected weight change.   HENT:  Negative for  congestion, ear pain, hearing loss, rhinorrhea, sinus pain and sore throat.    Eyes:  Negative for pain, redness and visual disturbance.   Respiratory:  Negative for apnea, cough, chest tightness, shortness of breath and wheezing.    Cardiovascular:  Negative for chest pain, palpitations and leg swelling.   Gastrointestinal:  Negative for abdominal distention, abdominal pain, blood in stool, constipation and nausea.   Endocrine: Negative for cold intolerance, heat intolerance and polyuria.   Genitourinary:  Negative for dysuria, hematuria and urgency.   Musculoskeletal:  Negative for arthralgias, back pain, gait problem, joint swelling, myalgias and neck pain.   Skin:  Negative for rash and wound.   Allergic/Immunologic: Negative for food allergies and immunocompromised state.   Neurological:  Negative for dizziness, seizures, facial asymmetry, speech difficulty, weakness, light-headedness, numbness and headaches.   Hematological:  Negative for adenopathy. Does not bruise/bleed easily.   Psychiatric/Behavioral:  Negative for behavioral problems, sleep disturbance and suicidal ideas. The patient is not nervous/anxious.             PHYSICAL EXAM:   /60   Pulse 84   Temp 97.4 °F (36.3 °C)   Ht 5' 9\" (1.753 m)   Wt 187 lb (84.8 kg)   SpO2 97%   BMI 27.62 kg/m²  Estimated body mass index is 27.62 kg/m² as calculated from the following:    Height as of this encounter: 5' 9\" (1.753 m).    Weight as of this encounter: 187 lb (84.8 kg).     Wt Readings from Last 3 Encounters:   04/02/25 187 lb (84.8 kg)   02/10/25 210 lb 6.4 oz (95.4 kg)   12/04/24 198 lb (89.8 kg)       Physical Exam  Vitals reviewed.   Constitutional:       General: He is not in acute distress.     Appearance: He is well-developed.   HENT:      Head: Normocephalic and atraumatic.   Eyes:      Conjunctiva/sclera: Conjunctivae normal.      Pupils: Pupils are equal, round, and reactive to light.   Neck:      Thyroid: No thyromegaly.    Cardiovascular:      Rate and Rhythm: Normal rate and regular rhythm.      Heart sounds: Normal heart sounds, S1 normal and S2 normal. No murmur heard.     No friction rub. No gallop.   Pulmonary:      Effort: Pulmonary effort is normal. No respiratory distress.      Breath sounds: Normal breath sounds. No wheezing or rales.   Chest:      Chest wall: No tenderness.   Abdominal:      General: Bowel sounds are normal. There is no distension.      Palpations: Abdomen is soft. There is no mass.      Tenderness: There is no abdominal tenderness. There is no guarding or rebound.   Musculoskeletal:         General: No tenderness. Normal range of motion.      Cervical back: Normal range of motion.   Lymphadenopathy:      Cervical: No cervical adenopathy.   Skin:     General: Skin is warm.      Findings: No erythema or rash.   Neurological:      Mental Status: He is alert and oriented to person, place, and time.      Cranial Nerves: No cranial nerve deficit.      Deep Tendon Reflexes: Reflexes are normal and symmetric.   Psychiatric:         Behavior: Behavior normal.         Thought Content: Thought content normal.         Judgment: Judgment normal.         Bilateral barefoot skin diabetic exam is normal, visualized feet and the appearance is normal.  Bilateral monofilament/sensation of both feet is normal.  Pulsation pedal pulse exam of both lower legs/feet is normal as well.        ASSESSMENT AND PLAN:   Patient is a 44 year old male who presents primarily presents for:    Assessment and Plan:    1. Obesity with recent weight loss:     - Significant weight loss of 20-25 pounds since last visit     - Initial weight: 210 pounds, current weight: 188 pounds     - Achieved through dietary modifications, primarily reducing carbohydrate intake     - Patient reports feeling better with lifestyle changes     - Continue current dietary modifications     - Monitor weight and blood sugar levels     - Follow-up in August or  September    2. Type 2 Diabetes Mellitus:     - Currently well-controlled on metformin     - Recent blood sugar levels reported as normal on March 31st     - Consider discontinuation of metformin based on normal blood sugar levels     - Evaluate need for continued metformin therapy     - Continue blood glucose monitoring    3. Cervical radiculopathy (suspected):     - Symptoms: intermittent head pain, sensation of electricity in the palm, pain in the middle (more on the left side), and occasional neck tightness     - X-rays obtained     - Schedule CT scan of cervical spine     - Continue gabapentin in the morning and evening     - Consider referral to specialist if nerve issue is confirmed on imaging    4. Hyperlipidemia:     - History of hyperlipidemia     - Current lipid status not discussed     - Recent weight loss and dietary changes may have positively impacted lipid profile     - Reassess lipid profile at next follow-up visit    Medications:  - Metformin (consider discontinuation)  - Gabapentin (morning and evening)          Health Maintenance:    Health Maintenance   Topic Date Due    Pneumococcal Vaccine: Birth to 50yrs (1 of 2 - PCV) Never done    COVID-19 Vaccine (1 - 2024-25 season) Never done    Diabetes Care: Foot Exam (Annual)  01/01/2025    Diabetes Care A1C  06/04/2025    Diabetes Care: GFR  09/27/2025    Influenza Vaccine (Season Ended) 10/01/2025    Annual Physical  12/04/2025    Diabetes Care Dilated Eye Exam  02/12/2027    DTaP,Tdap,and Td Vaccines (2 - Td or Tdap) 12/04/2034    Annual Depression Screening  Completed    Diabetes Care: Microalb/Creat Ratio (Annual)  Completed    Meningococcal B Vaccine  Aged Out         Meds & Refills for this Visit:  Requested Prescriptions      No prescriptions requested or ordered in this encounter       Orders Placed This Encounter   Procedures    Hemoglobin A1C    Microalb/Creat Ratio, Random Urine       Imaging & Consults:  None          No follow-ups on  file.  Important follow up notes/labs for next visit      Patient indicates understanding of the above recommendations and agrees to the above plan.  Reasurrance and education provided. All questions answered.    Notified to call with any questions, complications, allergies, or worsening or changing symptoms as well as any side effects or complications from the treatments .  Red flags/ ER precautions discussed.    If diagnostic labs or imaging ordered advised patient to contact my office for results  24-48 hours after completion    This note was dictated using dragon speech recognition transcription software.  Typographical and transcription errors may be present.  Please call if any questions.       As part of our commitment to providing you with comprehensive, transparent, and timely access to your health information, we adhere to the guidelines set forth by the 21st Century Cures Act. This Act enhances your rights to access your electronic health information and ensures that you can easily obtain your medical records.  Please note that the verbage used in this note is intended for medical documentation and communication and may be interpreted as forthright.  Please do not hesitate to contact my office if you have any questions.            Christiano Chance MD  [facility]

## 2025-04-10 ENCOUNTER — HOSPITAL ENCOUNTER (OUTPATIENT)
Dept: CT IMAGING | Facility: HOSPITAL | Age: 45
Discharge: HOME | End: 2025-04-10
Attending: INTERNAL MEDICINE
Payer: COMMERCIAL

## 2025-04-10 DIAGNOSIS — R20.0 ARM NUMBNESS: ICD-10-CM

## 2025-04-10 PROCEDURE — 72125 CT NECK SPINE W/O DYE: CPT | Performed by: INTERNAL MEDICINE

## 2025-04-16 ENCOUNTER — SPINE CENTER NAVIGATION (OUTPATIENT)
Age: 45
End: 2025-04-16

## 2025-04-16 NOTE — PROGRESS NOTES
Spine Center Referral Navigation Encounter Note    Referred by: Christiano Chance MD     Imaging: XR Cervical Spine 12/4/24, CT Cervical Spine 4/10/25   If imaging done at an external facility, instructed patient to bring disc of MRI to appointment.     Previously Seen Spine Care Providers: None    4/16-  called three times and call was answered but no sound.     4/16- Lvm for patient requesting call back to discuss.    4/22- Attempted to contact patient multiple times without response.  Please call at [phone], option #4, to schedule an appointment with a spine specialist if you need further assistance.

## 2025-05-22 ENCOUNTER — TELEPHONE (OUTPATIENT)
Age: 45
End: 2025-05-22

## 2025-05-22 RX ORDER — NAPROXEN 500 MG/1
500 TABLET ORAL 2 TIMES DAILY PRN
Qty: 60 TABLET | Refills: 2 | Status: SHIPPED | OUTPATIENT
Start: 2025-05-22

## 2025-05-22 NOTE — TELEPHONE ENCOUNTER
Pt called requesting a sooner appt  Pt stated that he still has headaches, more on top of head that transfers to the back and forehead, pt stated that he has been taking pain medication and the medication prescribed by pcp but does not help    Please advise

## 2025-05-22 NOTE — TELEPHONE ENCOUNTER
Spoke to  Jacky [phone] to Syed who states 2 days headache which has not changed since Dr. Chance's evaluation on 4/16/25. Syed requesting sooner than first available appointment. Patient has not had physical therapy, since he was not aware the therapy multiple times a week for several weeks. Syed has not scheduled physical therapy. He was waiting to call him to schedule physical therapy sessions. RN encouraging Syed to call physical therapy to start treatment. RN gave Syed [place] scheduling phone number.     Patient denied having contact from Spine Center. RN will contact Spine Center Navigator to request to contact him.RN sent a message to Spine Center Pool to call Syed;.     RN scheduled an appointment for Dr. Chance due to the patient's request for 6/4/25 at 3:40 PM.; RN reminded Syedmarga Chance is at the [address] [place] [address] office.

## 2025-05-29 ENCOUNTER — OFFICE VISIT (OUTPATIENT)
Dept: PAIN CLINIC | Facility: HOSPITAL | Age: 45
End: 2025-05-29
Attending: ANESTHESIOLOGY
Payer: COMMERCIAL

## 2025-05-29 VITALS — HEART RATE: 69 BPM | SYSTOLIC BLOOD PRESSURE: 117 MMHG | DIASTOLIC BLOOD PRESSURE: 77 MMHG

## 2025-05-29 DIAGNOSIS — G89.29 CHRONIC BILATERAL LOW BACK PAIN WITHOUT SCIATICA: ICD-10-CM

## 2025-05-29 DIAGNOSIS — M54.12 CERVICAL RADICULOPATHY: ICD-10-CM

## 2025-05-29 DIAGNOSIS — M54.2 CERVICALGIA: ICD-10-CM

## 2025-05-29 DIAGNOSIS — M47.812 CERVICAL SPONDYLOSIS: Primary | ICD-10-CM

## 2025-05-29 DIAGNOSIS — M54.17 LUMBOSACRAL RADICULOPATHY: ICD-10-CM

## 2025-05-29 DIAGNOSIS — M54.50 CHRONIC BILATERAL LOW BACK PAIN WITHOUT SCIATICA: ICD-10-CM

## 2025-05-29 DIAGNOSIS — G44.86 CERVICOGENIC HEADACHE: ICD-10-CM

## 2025-05-29 PROCEDURE — 99204 OFFICE O/P NEW MOD 45 MIN: CPT | Performed by: ANESTHESIOLOGY

## 2025-05-29 RX ORDER — METHYLPREDNISOLONE 4 MG/1
TABLET ORAL
Qty: 21 TABLET | Refills: 0 | Status: SHIPPED | OUTPATIENT
Start: 2025-05-29

## 2025-05-29 NOTE — PROGRESS NOTES
Patient presents in office today with reported pain in lower back, neck pain    Current pain level reported = 3/10

## 2025-05-29 NOTE — CHRONIC PAIN
[facility]  New Patient Evaluation        History of Present Illness:    Syed Sheppard is a 44 year old male with hx of HLD, DM2 on metformin, obesity with recent 20-25 pound weight loss, referred to the pain clinic by Dr. Chance,  for c/o neck tightness and pain with radiation into LUE to the hand, and intermittent headache, and left sided mid-back pain, which began ***.  The pain radiates into the ***.  ***No specific inciting event recalled.  No*** recent injury/trauma.  The pain is *** in character, and ***/10 usually.  Pt reports that the pain is worse with ***.  The pain is better with ***.  The pain has been getting *** worse steadily.  Pt denies numbness/tingling/weakness.  There is ***no incontinence of bowel/bladder.      Pain Course and Previous Interventions:  Medications:  gabapentin 300mg Bid, naproxen 500mg q12hrs prn  Interventions:  ***  Adjuvants:  ***    Blood Thinning Medications:  None    Current Medications:  Current Medications[1]     Allergies:  Allergies[2]     Review of Systems:  Bowel/Bladder Incontinence:  As above  Coughing/Sneezing/Straining does ***not exacerbate the pain.  Numbness/tingling:  As above  Weakness:  As above  Weight Loss:  Negative  Fever:  Negative  Cardiovascular:  No current chest pain or palpitations  Respiratory:  No current shortness of breath  Gastrointestinal:  No active ulcer  Genitourinary:  Negative  Integumentary:  Negative  Psychiatric:  Negative  Hematologic:  No active bleeding  Lymphatic:  No current lymphedema  Allergic/Immunologic:  Negative  Musculoskeletal:  As above  Neurological:  As above  Denies chest pain, shortness of breath.    Medical History:  Problem List[3]     Past Medical History[4]    Surgical History:  Past Surgical History[5]     Family History:   Family History[6]     Social History:  Social History     Socioeconomic History    Marital status:      Spouse name: Not on file    Number of  children: Not on file    Years of education: Not on file    Highest education level: Not on file   Occupational History    Not on file   Tobacco Use    Smoking status: Former     Types: Cigarettes     Passive exposure: Past (quit 8/2024)    Smokeless tobacco: Never   Vaping Use    Vaping status: Former   Substance and Sexual Activity    Alcohol use: Not Currently     Comment: socially    Drug use: Never    Sexual activity: Not on file   Other Topics Concern    Not on file   Social History Narrative    Not on file     Social Drivers of Health     Food Insecurity: Not on file   Transportation Needs: Not on file   Housing Stability: Not on file        Physical Examination:  Vitals:    05/29/25 1050   BP: 117/77   Pulse: 69        General:  Alert and oriented x3  Affect:  NAD  Head:  Normocephalic, atraumatic  Eyes:  ***anicteric; no injection  CV:  RRR, no m/r/g  Respiratory:  CTAB ***  Gait:  ***, cane user:  {yes/default no:[id number]::\"no\"}  Spine:  ***    ROM:    LUMBAR SPINE    Degree Pain   Flexion ***90 No   Extension 30 No   Left SB 30 No   Right SB 30 No   Left SB/E 30 No   Right SB/E 30 No     CERVICAL SPINE    Degree Pain   Flexion ***45 No   Extension 30 No   Left SB 30 No   Right SB 30 No   Left Rotation 80 No   Right Rotation 80 No     KNEES    Degree Pain   Right Flexion ***120 No   Right Extension 0 No   Left Flexion 120 No   Left Extension 0 No     SHOULDERS    LEFT Pain RIGHT Pain   Flexion ***180 No 180 No   Abduction 180 No 180 No   Internal Rotation T9 No T9 No   External Rotation T2 No T2 No     MOTOR EXAMINATION:    UPPER EXTREMITY    LEFT RIGHT   Deltoid ***5/5 5/5   Biceps 5/5 5/5   Triceps 5/5 5/5   Brachioradialis 5/5 5/5   Wrist Flexors 5/5 5/5   Wrist Extensors 5/5 5/5   Intrinsic Hand 5/5 5/5    5/5 5/5     LOWER EXTREMITY    LEFT RIGHT   Iliopsoas ***5/5 5/5   Quadriceps 5/5 5/5   Foot DF 5/5 5/5   Foot EHL 5/5 5/5   Gastrocnemius 5/5 5/5     PULSES    LEFT RIGHT   Radial ***2/4 2/4    Dorsalis Pedis 2/4 2/4   Posterior Tibial 2/4 2/4   Brachial 2/4 2/4     SLR:  ***negative  SIJ tenderness:  ***negative  Samantha's test:  ***negative  Gaenslen's Test:  ***negative  Joint Exam:  ***  TPs:  ***    Right Deep tendon reflexes:  ***  Left Deep tendon reflexes:  ***  Babinski Reflex:  Absent bilaterally***  Temperature:  ***normal to touch bilateral upper and lower extremities  Edema:  ***  Skin - normal ***    Sensation (light touch/pinprick/temperature):  Right Lower Extremity:  ***  Left Lower Extremity:  ***  Right Upper Extremity:  ***  Left Upper Extremity:  ***    IMAGING:  PROCEDURE: CT SPINE CERVICAL (CPT=72125)     COMPARISON: [facility], XR CERVICAL SPINE (4 VIEWS) (CPT=72050), 12/04/2024, 12:56 PM.     INDICATIONS: R20.0 Arm numbness     TECHNIQUE: Multidetector CT images of the cervical spine were obtained without the infusion of non-ionic intravenous contrast material. Automated exposure control for dose reduction was used. Adjustment of the mA and/or kV was done based on the patient's   size. Iterative reconstruction technique for dose reduction was employed.       FINDINGS:  ALIGNMENT: The anatomic cervical lordosis is preserved.  Mild to moderate dextroscoliosis.  BONES: No fractures or osseous lesions are apparent.  DISCS: There are moderate multilevel discogenic and facet related degenerative changes of the cervical spine.  CRANIOCERVICAL AREA: Normal foramen magnum without Chiari malformation.    PARASPINAL AREA: No visible mass.    OTHER: There is no visible swelling of the prevertebral soft tissues     CERVICAL DISC LEVELS:  C1-C2: Minor atlantoaxial articulation degeneration.  C2-C3: No significant disc/facet abnormality, spinal stenosis, or foraminal stenosis.    C3-C4: Mild disc and right greater than left uncovertebral/facet joint related degeneration.  No high-grade spinal canal or neural foraminal stenosis.  C4-C5: Mild disc and minor left  uncovertebral/facet joint related degeneration.  No high-grade spinal canal or neural foraminal stenosis.  C5-C6: Mild disc and minimal uncovertebral/facet joint related degeneration, which does not result in significant spinal canal or neural foraminal stenosis.  C6-C7: Diffuse disc bulge with minor bilateral uncovertebral joint hypertrophy/facet arthropathy.  No high-grade spinal canal or neural foraminal stenosis; there is minor bilateral neural foraminal stenosis.  C7-T1: No significant disc/facet abnormality, spinal stenosis, or foraminal stenosis.                Impression  CONCLUSION:  1. No acute fracture or traumatic subluxation of the cervical spine.  Subtle lucency at the C5 spinous process on prior December, 2024 cervical spine radiographs was likely artifactual or developmental.  2. Mild multilevel cervical spine degenerative changes.  Findings result in minor bilateral neural foraminal stenosis at C6-C7.  No other significant spinal canal or neural foraminal stenosis throughout the cervical spine.  3. Mild-to-moderate dextroscoliosis of the cervical spine.  4. Lesser incidental findings as above.        elm-remote     Dictated by (CST): Roi Cruz MD on 4/16/2025 at 7:36 AM      Finalized by (CST): Rio Cruz MD on 4/16/2025 at 7:40 AM            PROCEDURE: XR CERVICAL SPINE (4 OR 5 VIEWS) (CPT=72050)     COMPARISON: None.     INDICATIONS: Posterior cervical pain x2 weeks. No known injury.     TECHNIQUE: Cervical spine radiographs (5 views)     FINDINGS:        ALIGNMENT: There is maintenance of the normal cervical lordosis.  Mild dextroscoliosis.  ATLANTOAXIAL: The altanoaxial alignment is maintained.  VERTEBRAL BODIES:   Vertebral body heights are maintained.  DISC SPACES: There is multilevel loss of intervertebral disc height.  This is most notable at C6-7, where it is moderate.  Multilevel associated osteophytes.  PREVERTEBRAL SOFT TISSUES: Negative  DEGENERATIVE: There is multilevel  uncovertebral and facet hypertrophy.  No definite neural foraminal narrowing.  OTHER:  There is a vertically oriented lucency through the posterior aspect of the spinous process of the C5 level.  There is no osseous malalignment.   Odontoid process is intact.               Impression   CONCLUSION:  Vertically oriented lucency through the posterior C5 spinous process.  May represent a nondisplaced spinous process fracture.  Alternatively, this finding could be degenerative in etiology.  Correlate with history of trauma.     Mild-to-moderate multilevel degenerative disease of the spine, without osseous neural foraminal narrowing.           Dictated by (CST): Michelle Mcmahan MD on 12/04/2024 at 3:32 PM      Finalized by (CST): Michelle Mcmahan MD on 12/04/2024 at 3:35 PM         ASSESSMENT:  44 year old male with ***    PLAN:  RECOMMENDATIONS:  1)  Medical Modalities: ***  2)  Interventional Modalities: ***  3)  Behavioral Medicine Modalities:  *** none  4)  Other Modalities:  *** Continue HEP  5)  Imaging/Labs:  ***    Pt will return to clinic for *** followup in ***4 weeks.         [1]   Current Outpatient Medications   Medication Sig Dispense Refill    naproxen 500 MG Oral Tab Take 1 tablet (500 mg total) by mouth 2 (two) times daily as needed. 60 tablet 2    triamcinolone 0.5 % External Cream Apply 1 Application topically 3 (three) times daily. 1 each 1    gabapentin 300 MG Oral Cap Take 2 capsules (600 mg total) by mouth nightly. (Patient taking differently: Take 2 capsules (600 mg total) by mouth nightly. Pt takes 1 capsule in the AM, 1 capsule in the PM) 60 capsule 1    Blood Glucose Monitoring Suppl (D-CARE GLUCOMETER) w/Device Does not apply Kit 1 Application daily. Check glucose daily 1 kit 0    atorvastatin 10 MG Oral Tab Take 1 tablet (10 mg total) by mouth nightly. 90 tablet 1    metFORMIN  MG Oral Tablet 24 Hr Take 1 tablet (500 mg total) by mouth daily. (Patient not taking: Reported on 5/29/2025)  90 tablet 1   [2] No Known Allergies  [3] There is no problem list on file for this patient.  [4] History reviewed. No pertinent past medical history.  [5] History reviewed. No pertinent surgical history.  [6]   Family History  Problem Relation Age of Onset    No Known Problems Father     Hypertension Mother     No Known Problems Daughter     No Known Problems Son

## 2025-05-29 NOTE — PATIENT INSTRUCTIONS
Refill policies:     Contact your pharmacy at least 5 days prior to running out of medication and have them send an electronic request or submit request through the “request refill” option in your [employer] account.  Allow 3-5 business days for refills; controlled substances may take longer.  If your prescription is due for a refill, please make sure you have a follow up appointment scheduled with the appropriate prescribing physician.  To best provide you care, patients receiving routine medications need to be seen at least once a year.  Patients receiving narcotic/controlled substance medications need to be seen at least once every 3 months.  Patients receiving narcotic/controlled substance medications will be required to sign an Opioid Treatment Agreement/Contract.  Refills will not be refilled on weekends or holidays; on-call physicians will not refill routine medications.  No narcotics or controlled substances are refilled after noon on Fridays or by on-call physicians.  Federal Law states narcotics must be electronically prescribed.  A 30-day supply with no refills is the maximum allowed by law.  In the event that your preferred pharmacy does not have the requested medication in stock (e.g., Backordered), it is your responsibility to find another pharmacy that has the requested medication available.  We will gladly send a new prescription to that pharmacy at your request.

## 2025-06-04 ENCOUNTER — OFFICE VISIT (OUTPATIENT)
Age: 45
End: 2025-06-04
Payer: COMMERCIAL

## 2025-06-04 VITALS
HEART RATE: 83 BPM | OXYGEN SATURATION: 99 % | SYSTOLIC BLOOD PRESSURE: 122 MMHG | WEIGHT: 187 LBS | TEMPERATURE: 97 F | DIASTOLIC BLOOD PRESSURE: 80 MMHG | HEIGHT: 69 IN | BODY MASS INDEX: 27.7 KG/M2

## 2025-06-04 DIAGNOSIS — M54.12 CERVICAL RADICULOPATHY: Primary | ICD-10-CM

## 2025-06-04 DIAGNOSIS — M54.16 LUMBAR RADICULOPATHY: ICD-10-CM

## 2025-06-04 PROCEDURE — 99214 OFFICE O/P EST MOD 30 MIN: CPT | Performed by: INTERNAL MEDICINE

## 2025-06-04 PROCEDURE — 3008F BODY MASS INDEX DOCD: CPT | Performed by: INTERNAL MEDICINE

## 2025-06-04 PROCEDURE — 3074F SYST BP LT 130 MM HG: CPT | Performed by: INTERNAL MEDICINE

## 2025-06-04 PROCEDURE — 3079F DIAST BP 80-89 MM HG: CPT | Performed by: INTERNAL MEDICINE

## 2025-06-10 ENCOUNTER — TELEPHONE (OUTPATIENT)
Dept: PHYSICAL THERAPY | Facility: HOSPITAL | Age: 45
End: 2025-06-10

## 2025-06-10 NOTE — PROGRESS NOTES
[facility] part of [facility]  Return Patient Progress Note      HPI:     Chief Complaint   Patient presents with    Headache    Test Results     CT spine results       Syed Sheppard is a 44 year old male presenting for:    Has a significant  has no past medical history on file.    Here to discuss CT spin of neck.  Still having discomfort.            Labs:   CMP:  Lab Results   Component Value Date/Time     09/27/2024 03:35 PM    K 3.8 09/27/2024 03:35 PM     09/27/2024 03:35 PM    CO2 26.0 09/27/2024 03:35 PM    CREATSERUM 1.04 09/27/2024 03:35 PM    CA 9.6 09/27/2024 03:35 PM     (H) 09/27/2024 03:35 PM    TP 7.8 09/27/2024 03:35 PM    ALB 4.7 09/27/2024 03:35 PM    ALKPHO 57 09/27/2024 03:35 PM    AST 27 09/27/2024 03:35 PM    ALT 34 09/27/2024 03:35 PM    BILT 0.4 09/27/2024 03:35 PM        CBC:  Lab Results   Component Value Date    WBC 8.0 09/27/2024    HGB 15.4 09/27/2024    HCT 43.9 09/27/2024    .0 09/27/2024    NEPERCENT 55.4 09/27/2024    LYPERCENT 33.9 09/27/2024    MOPERCENT 8.7 09/27/2024    EOPERCENT 1.2 09/27/2024    BAPERCENT 0.6 09/27/2024    NE 4.44 09/27/2024    LYMABS 2.72 09/27/2024    MOABSO 0.70 09/27/2024    EOABSO 0.10 09/27/2024    BAABSO 0.05 09/27/2024          Hemoglobin A1C, Microalbumin  Lab Results   Component Value Date/Time    A1C 6.0 (H) 04/02/2025 02:52 PM        Lipid panel  Lab Results   Component Value Date/Time    CHOLEST 163 12/04/2024 01:03 PM    HDL 38 (L) 12/04/2024 01:03 PM    TRIG 148 12/04/2024 01:03 PM    LDL 99 12/04/2024 01:03 PM    NONHDLC 125 12/04/2024 01:03 PM        Medications:  Current Medications[1]   PMH:  Past Medical History[2]            REVIEW OF SYSTEMS:   Review of Systems   Constitutional:  Negative for chills, fatigue, fever and unexpected weight change.   HENT:  Negative for congestion, ear pain, hearing loss, rhinorrhea, sinus pain and sore throat.    Eyes:  Negative for pain, redness and visual  disturbance.   Respiratory:  Negative for apnea, cough, chest tightness, shortness of breath and wheezing.    Cardiovascular:  Negative for chest pain, palpitations and leg swelling.   Gastrointestinal:  Negative for abdominal distention, abdominal pain, blood in stool, constipation and nausea.   Endocrine: Negative for cold intolerance, heat intolerance and polyuria.   Genitourinary:  Negative for dysuria, hematuria and urgency.   Musculoskeletal:  Positive for neck pain. Negative for arthralgias, back pain, gait problem, joint swelling and myalgias.   Skin:  Negative for rash and wound.   Allergic/Immunologic: Negative for food allergies and immunocompromised state.   Neurological:  Negative for dizziness, seizures, facial asymmetry, speech difficulty, weakness, light-headedness, numbness and headaches.   Hematological:  Negative for adenopathy. Does not bruise/bleed easily.   Psychiatric/Behavioral:  Negative for behavioral problems, sleep disturbance and suicidal ideas. The patient is not nervous/anxious.             PHYSICAL EXAM:   /80   Pulse 83   Temp 97.3 °F (36.3 °C)   Ht 5' 9\" (1.753 m)   Wt 187 lb (84.8 kg)   SpO2 99%   BMI 27.62 kg/m²  Estimated body mass index is 27.62 kg/m² as calculated from the following:    Height as of this encounter: 5' 9\" (1.753 m).    Weight as of this encounter: 187 lb (84.8 kg).     Wt Readings from Last 3 Encounters:   06/04/25 187 lb (84.8 kg)   04/02/25 187 lb (84.8 kg)   02/10/25 210 lb 6.4 oz (95.4 kg)       Physical Exam  Vitals reviewed.   Constitutional:       General: He is not in acute distress.     Appearance: He is well-developed.   HENT:      Head: Normocephalic and atraumatic.   Eyes:      Conjunctiva/sclera: Conjunctivae normal.      Pupils: Pupils are equal, round, and reactive to light.   Neck:      Thyroid: No thyromegaly.   Cardiovascular:      Rate and Rhythm: Normal rate and regular rhythm.      Heart sounds: Normal heart sounds, S1 normal and  S2 normal. No murmur heard.     No friction rub. No gallop.   Pulmonary:      Effort: Pulmonary effort is normal. No respiratory distress.      Breath sounds: Normal breath sounds. No wheezing or rales.   Chest:      Chest wall: No tenderness.   Abdominal:      General: Bowel sounds are normal. There is no distension.      Palpations: Abdomen is soft. There is no mass.      Tenderness: There is no abdominal tenderness. There is no guarding or rebound.   Musculoskeletal:         General: No tenderness. Normal range of motion.      Cervical back: Normal range of motion.   Lymphadenopathy:      Cervical: No cervical adenopathy.   Skin:     General: Skin is warm.      Findings: No erythema or rash.   Neurological:      Mental Status: He is alert and oriented to person, place, and time.      Cranial Nerves: No cranial nerve deficit.      Deep Tendon Reflexes: Reflexes are normal and symmetric.   Psychiatric:         Behavior: Behavior normal.         Thought Content: Thought content normal.         Judgment: Judgment normal.               ASSESSMENT AND PLAN:   Patient is a 44 year old male who presents primarily presents for:    (M54.12) Cervical radiculopathy  (primary encounter diagnosis)  Plan: Physical Therapy Referral - [facility]         (M54.16) Lumbar radiculopathy  Plan: Physical Therapy Referral - [facility]          Discussed imaging findings.  Mulitlevel degenerative changes.  Recommend physical therapy.  He agreed.  Referral also to pain management and spine clinic.              Health Maintenance:    Health Maintenance   Topic Date Due    Pneumococcal Vaccine: Birth to 50yrs (1 of 2 - PCV) Never done    COVID-19 Vaccine (1 - 2024-25 season) Never done    Diabetes Care: GFR  09/27/2025    Influenza Vaccine (Season Ended) 10/01/2025    Diabetes Care A1C  10/02/2025    Annual Physical  12/04/2025    Diabetes Care Foot Exam  04/02/2026    Diabetes Care Dilated Eye Exam  02/12/2027     DTaP,Tdap,and Td Vaccines (2 - Td or Tdap) 12/04/2034    Annual Depression Screening  Completed    Diabetes Care: Microalb/Creat Ratio (Annual)  Completed    Meningococcal B Vaccine  Aged Out         Meds & Refills for this Visit:  Requested Prescriptions      No prescriptions requested or ordered in this encounter       No orders of the defined types were placed in this encounter.      Imaging & Consults:  PHYSICAL THERAPY - INTERNAL          Return in about 3 months (around 9/4/2025) for Symptom monitoring.  Important follow up notes/labs for next visit      Patient indicates understanding of the above recommendations and agrees to the above plan.  Reasurrance and education provided. All questions answered.    Notified to call with any questions, complications, allergies, or worsening or changing symptoms as well as any side effects or complications from the treatments .  Red flags/ ER precautions discussed.    If diagnostic labs or imaging ordered advised patient to contact my office for results  24-48 hours after completion    This note was dictated using dragon speech recognition transcription software.  Typographical and transcription errors may be present.  Please call if any questions.       As part of our commitment to providing you with comprehensive, transparent, and timely access to your health information, we adhere to the guidelines set forth by the 21st Century Cures Act. This Act enhances your rights to access your electronic health information and ensures that you can easily obtain your medical records.  Please note that the verbage used in this note is intended for medical documentation and communication and may be interpreted as forthright.  Please do not hesitate to contact my office if you have any questions.            Christiano Chance MD  [facility]                         [1]   Current Outpatient Medications   Medication Sig Dispense Refill    methylPREDNISolone 4 MG Oral Tablet Therapy Pack Take  as directed on dose pack instructions 21 tablet 0    naproxen 500 MG Oral Tab Take 1 tablet (500 mg total) by mouth 2 (two) times daily as needed. 60 tablet 2    triamcinolone 0.5 % External Cream Apply 1 Application topically 3 (three) times daily. 1 each 1    gabapentin 300 MG Oral Cap Take 2 capsules (600 mg total) by mouth nightly. (Patient taking differently: Take 2 capsules (600 mg total) by mouth nightly. Pt takes 1 capsule in the AM, 1 capsule in the PM) 60 capsule 1    metFORMIN  MG Oral Tablet 24 Hr Take 1 tablet (500 mg total) by mouth daily. (Patient not taking: Reported on 5/29/2025) 90 tablet 1    Blood Glucose Monitoring Suppl (D-CARE GLUCOMETER) w/Device Does not apply Kit 1 Application daily. Check glucose daily 1 kit 0    atorvastatin 10 MG Oral Tab Take 1 tablet (10 mg total) by mouth nightly. 90 tablet 1   [2] No past medical history on file.

## 2025-06-11 ENCOUNTER — OFFICE VISIT (OUTPATIENT)
Dept: PHYSICAL THERAPY | Age: 45
End: 2025-06-11
Attending: INTERNAL MEDICINE
Payer: COMMERCIAL

## 2025-06-11 DIAGNOSIS — R20.0 ARM NUMBNESS: Primary | ICD-10-CM

## 2025-06-11 DIAGNOSIS — M54.2 NECK PAIN: ICD-10-CM

## 2025-06-11 PROBLEM — M54.50 CHRONIC BILATERAL LOW BACK PAIN WITHOUT SCIATICA: Status: ACTIVE | Noted: 2025-06-11

## 2025-06-11 PROBLEM — G89.29 CHRONIC BILATERAL LOW BACK PAIN WITHOUT SCIATICA: Status: ACTIVE | Noted: 2025-06-11

## 2025-06-11 PROBLEM — M54.17 LUMBOSACRAL RADICULOPATHY: Status: ACTIVE | Noted: 2025-06-11

## 2025-06-11 PROBLEM — M47.812 CERVICAL SPONDYLOSIS: Status: ACTIVE | Noted: 2025-06-11

## 2025-06-11 PROBLEM — G44.86 CERVICOGENIC HEADACHE: Status: ACTIVE | Noted: 2025-06-11

## 2025-06-11 PROBLEM — M54.12 CERVICAL RADICULOPATHY: Status: ACTIVE | Noted: 2025-06-11

## 2025-06-11 PROCEDURE — 97161 PT EVAL LOW COMPLEX 20 MIN: CPT | Performed by: PHYSICAL THERAPIST

## 2025-06-12 NOTE — PROGRESS NOTES
SPINE EVALUATION:     Diagnosis:   Cervical radiculopathy (M54.12)  Lumbar radiculopathy (M54.16) Patient:  Syed Sheppard (44 year old, male)        Referring Provider: Christiano Chance  Today's Date   6/11/2025    Precautions:  Other (use comment) (ID number [id number] Mikael)   Date of Evaluation: No data recorded  Next MD visit: No data recorded  Date of Surgery: No data recorded     PATIENT SUMMARY     Summary of chief complaints:  pain at neck and lower back, progressing as day goes on.  History of current condition: coming in for neck and back pain. States has tension at the neck - does go away at times, started 2 months ago- no noted injury.  Started as he was stressed 2 months ago., States the back pain started 4 years with leg pain, had a massage and went away, now just back pain has remained the last 4 years.  States does work at computer during day, sits/stands at times.  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  uses computer during day for work.   Leisure activities/Hobbies:  None noted   Prior level of function: prior to 2 months ago neck was less stressful, ongoing back pain last 4 years.  Current limitations: lifting > 50#'s, difficulty looking up with neck, - some cracking. neck painful when working at computer.  Pt goals: 1. feel better  Red flag signs/symptoms:    None noted  Past medical history was reviewed with Syed.    Imaging/Tests:   CT cervical spine  Syed  has no past medical history on file.  He  has no past surgical history on file.    ASSESSMENT  Syed presents to physical therapy evaluation with primary c/o  pain neck/back worsening as day goes on. The results of the objective tests and measures show  poor posture sitting/standing with forward  head/shoulder positioning, impaired scapular strength, impaired lumbar movement into extension, impaired LE flexibility L > R and c/o pain . Functional deficits include but are not limited to lifting > 50#'s, difficulty looking up with neck, - some cracking. neck painful when working at computer.. Signs and symptoms are consistent with diagnosis of Cervical radiculopathy (M54.12)  Lumbar radiculopathy (M54.16). Pt and PT discussed evaluation findings, pathology, POC and HEP.  Pt voiced understanding and performs HEP correctly without reported pain. Skilled Physical Therapy is medically necessary to address the above impairments and reach functional goals.    OBJECTIVE:      Musculoskeletal:  Observation/Posture: rounded shoulders; forward head posture; decreased cervical lordosis; flattened upper thoracic kyphosis; posterior pelvic tilt    Palpation: tenderness R/L UT/cervical paraspinals, no tenderness lumbar paraspinals     Special tests:   Lumbar mobility- Flexion mod LOM (noted HS restriction) Ext Mod LOM, SBR/L mild LOM, rotat R/L WNL.  Pain lower back all motions.  Cervical AROM:  Ext 60, ROT R/L 70, Flex 60, SBR/L 40.          ROM and Strength:  (* denotes performed with pain)  UE ROM WNL, strength WNL shoulder.  Scapular region mid trap - assist to place in correct position - 4/5, lower trap 4 minus/5 bilat,   LE all joints WNL.  Strength 5/5    Flexibility:  LE Flexibility R L     Hip Flexor  WNL  WNL     Hamstrings  -30  -50     ITB  NT  NT     Piriformis  Mod restriction  Mod restriction     Quads  Mod restriction  Mod restriction     Gastroc-soleus  NT  NT       Neurological:  Sensation:     intact      Balance and Functional Mobility:  Gait: pt ambulates on level ground with  .normal gait pattern         Today's Treatment and Response:   Pt education was provided on exam findings, treatment diagnosis, treatment plan, expectations, and prognosis.      Charges:  PT EVAL:  ,    In agreement with evaluation  findings and clinical rationale, this evaluation involved LOW COMPLEXITY decision making due to no personal factors/comorbidities, 1-2 body structures involved/activity limitations, and stable symptoms as documented in the evaluation.                                                                         PLAN OF CARE:      Goals: (to be met in 8 )   Patient to understand and be aware of posture and its affect on pain at neck and back.  Instructions for correction during day.  Patient to be independent with ongoing HEP for flexibility, strengthening/stabilization.   Patient to report 50% or greater decrease in pain neck/back.     Frequency / Duration: Patient will be seen 1-2 x/week or a total of   8 visits over a 90 day period. Treatment will include:  manual therapy, therapeutic exercise, HEP, education on posture    Education or treatment limitation:  None   Rehab Potential:  Good     Neck Disability Index Score  Score: (Patient-Rptd) 18 % (6/11/2025  4:23 PM)      Patient was advised of these findings, precautions, and treatment options and has agreed to actively participate in planning and for this course of care.    Thank you for your referral. Please co-sign or sign and return this letter via fax as soon as possible to [phone]. If you have any questions, please contact me at Dept: [phone]    Sincerely,  Electronically signed by therapist: Muna Nick, PT  Physician's certification required: Yes  I certify the need for these services furnished under this plan of treatment and while under my care.    X___________________________________________________ Date____________________    Certification From: 6/11/2025  To: 9/9/2025

## 2025-06-13 ENCOUNTER — OFFICE VISIT (OUTPATIENT)
Dept: PHYSICAL THERAPY | Age: 45
End: 2025-06-13
Attending: INTERNAL MEDICINE
Payer: COMMERCIAL

## 2025-06-13 ENCOUNTER — TELEPHONE (OUTPATIENT)
Dept: INTERNAL MEDICINE CLINIC | Facility: CLINIC | Age: 45
End: 2025-06-13

## 2025-06-13 PROCEDURE — 97110 THERAPEUTIC EXERCISES: CPT

## 2025-06-13 PROCEDURE — 97530 THERAPEUTIC ACTIVITIES: CPT

## 2025-06-13 NOTE — TELEPHONE ENCOUNTER
Spoke with  Rebecca # [id number] to Syed who states a male who was speaking English but hung up prior to questioning this person. Patient inquiring if the person from Dr. Chance's office? RN informed Syed there are no notes that called this patient. Patient voiced understanding.

## 2025-06-13 NOTE — TELEPHONE ENCOUNTER
Patient is calling in stating he received a call about blood work results, patient states he did not understand who it was nor what they were saying. Please advise.

## 2025-06-15 NOTE — PROGRESS NOTES
Patient: Syed Sheppard (44 year old, male) Referring Provider:  Insurance:   Diagnosis: Cervical radiculopathy (M54.12)  Lumbar radiculopathy (M54.16) Christiano Chance  [facility]   Date of Surgery: No data recorded Next MD visit:  N/A   Precautions:  Other (use comment) (ID number [id number] Mikael) No data recorded Referral Information:    Date of Evaluation: Req: 5, Auth: 5, Exp: 8/16/2025    No data recorded POC Auth Visits:  5       Today's Date   6/13/2025    Subjective   present throughout session via [employer].  Having neck and back pain as per eval.       Pain: 3/10     Objective  See below treatment grid.          Assessment  Pt able to correct cervical/scap/thoracic posturing with verbal cueing; continued pain but good tolerance for initial scap/thoracic strengthening.       Plan  Progress scap/thoracic strengthening as able.    Treatment Last 4 Visits  Treatment Day: 2       6/11/2025 6/13/2025   Spine Treatment   Therapeutic Exercise  - NuStep L5 x8' B UE/LE  - Stretches: Hamstrings R, L; Standing quad stretch R, L; gentle upper trap stretches R, L 3x ea  - PPU's 2x10  - B Shoulder Row, Extension, Flexion to 90 ea x30   Therapeutic Activity  - Discussed work station setup including sitting posture, avoidance of reaching, keyboard at hand height with 90 deg elbow flexion, top of monitor screen at eye level, seat height so that top of thighs parallel to floor.   Therapeutic Exercise Minutes  30   Therapeutic Activity Minutes  15   Total Time Of Timed Procedures 0 45   Total Time Of Service-Based Procedures 0 0   Total Treatment Time 0 45   HEP  Printed in New Zealander and given to pt:  Access Code: [email or web address]  URL: [email or web address]  Date: 06/13/2025  Prepared by: Rosalio Cat    Exercises  - Prone Press Up  - 1 x daily - 7 x weekly - 3 sets - 10 reps  - Seated Table Hamstring Stretch  - 1 x daily - 7 x weekly - 3 sets - 10 reps - 30 seconds hold  - Standing  Quadriceps Stretch  - 1 x daily - 7 x weekly - 3 sets - 10 reps - 30 seconds hold  - Seated Cervical Flexion Stretch with Finger Support Behind Neck  - 1 x daily - 7 x weekly - 3 sets - 10 reps - 30 seconds hold  - Standing Shoulder Row with Anchored Resistance  - 1 x daily - 7 x weekly - 3 sets - 10 reps  - Shoulder extension with resistance - Neutral  - 1 x daily - 7 x weekly - 3 sets - 10 reps  - Standing Shoulder Flexion with Posterior Anchored Resistance  - 1 x daily - 7 x weekly - 3 sets - 10 reps        HEP  Printed in Filipino and given to pt:  Access Code: [email or web address]  URL: [email or web address]  Date: 06/13/2025  Prepared by: Rosalio Cat    Exercises  - Prone Press Up  - 1 x daily - 7 x weekly - 3 sets - 10 reps  - Seated Table Hamstring Stretch  - 1 x daily - 7 x weekly - 3 sets - 10 reps - 30 seconds hold  - Standing Quadriceps Stretch  - 1 x daily - 7 x weekly - 3 sets - 10 reps - 30 seconds hold  - Seated Cervical Flexion Stretch with Finger Support Behind Neck  - 1 x daily - 7 x weekly - 3 sets - 10 reps - 30 seconds hold  - Standing Shoulder Row with Anchored Resistance  - 1 x daily - 7 x weekly - 3 sets - 10 reps  - Shoulder extension with resistance - Neutral  - 1 x daily - 7 x weekly - 3 sets - 10 reps  - Standing Shoulder Flexion with Posterior Anchored Resistance  - 1 x daily - 7 x weekly - 3 sets - 10 reps    Charges  Ther Ex x2, Ther Act

## 2025-06-16 ENCOUNTER — OFFICE VISIT (OUTPATIENT)
Dept: PHYSICAL THERAPY | Age: 45
End: 2025-06-16
Attending: INTERNAL MEDICINE
Payer: COMMERCIAL

## 2025-06-16 PROCEDURE — 97530 THERAPEUTIC ACTIVITIES: CPT

## 2025-06-16 PROCEDURE — 97110 THERAPEUTIC EXERCISES: CPT

## 2025-06-16 NOTE — PROGRESS NOTES
Patient: Syed Sheppard (44 year old, male) Referring Provider:  Insurance:   Diagnosis: Cervical radiculopathy (M54.12)  Lumbar radiculopathy (M54.16) [facility]  [facility]   Date of Surgery: No data recorded Next MD visit:  N/A   Precautions:  Other (use comment) (ID number [id number] Mikael) No data recorded Referral Information:    Date of Evaluation: Req: 5, Auth: 5, Exp: 8/16/2025    No data recorded POC Auth Visits:  5       Today's Date   6/16/2025    Subjective  No pain today.  Made adjustments to workstation including fabricating a monitor stand.  He also brought the keyboard closer to him.  Played volleyball yesterday.       Pain: 0/10     Objective  See below treatment grid.        Assessment  Much improved symptom levels.       Plan  Progress scap/thoracic strengthening as able.    Treatment Last 4 Visits  Treatment Day: 3       6/11/2025 6/13/2025 6/16/2025   Spine Treatment   Therapeutic Exercise  - NuStep L5 x8' B UE/LE  - Stretches: Hamstrings R, L; Standing quad stretch R, L; gentle upper trap stretches R, L 3x ea  - PPU's 2x10  - B Shoulder Row, Extension, Flexion to 90 ea x30 - NuStep L5 x8' B UE/LE   - Stretches: Hamstrings R, L; Standing quad stretch R, L; gentle upper trap stretches R, L 3x ea   - PPU's 2x10   - B Shoulder Row, Extension, Flexion to 90, internal rotation, external rotation -  ea x30 GreenTT       Therapeutic Activity  - Discussed work station setup including sitting posture, avoidance of reaching, keyboard at hand height with 90 deg elbow flexion, top of monitor screen at eye level, seat height so that top of thighs parallel to floor. - Reviewed work station set up and changes  - NEXT - consider adding squats/lunges and discuss body mechanics   Therapeutic Exercise Minutes  30 35   Therapeutic Activity Minutes  15 10   Total Time Of Timed Procedures 0 45 45   Total Time Of Service-Based Procedures 0 0 0   Total Treatment Time 0 45 45   HEP  Printed in Mongolian and  given to pt:  Access Code: [email or web address]  URL: [email or web address]  Date: 06/13/2025  Prepared by: Rosalio Cat    Exercises  - Prone Press Up  - 1 x daily - 7 x weekly - 3 sets - 10 reps  - Seated Table Hamstring Stretch  - 1 x daily - 7 x weekly - 3 sets - 10 reps - 30 seconds hold  - Standing Quadriceps Stretch  - 1 x daily - 7 x weekly - 3 sets - 10 reps - 30 seconds hold  - Seated Cervical Flexion Stretch with Finger Support Behind Neck  - 1 x daily - 7 x weekly - 3 sets - 10 reps - 30 seconds hold  - Standing Shoulder Row with Anchored Resistance  - 1 x daily - 7 x weekly - 3 sets - 10 reps  - Shoulder extension with resistance - Neutral  - 1 x daily - 7 x weekly - 3 sets - 10 reps  - Standing Shoulder Flexion with Posterior Anchored Resistance  - 1 x daily - 7 x weekly - 3 sets - 10 reps Access Code: [email or web address]  Printed new exercises in Mosotho        HEP  Access Code: [email or web address]  Printed new exercises in Mosotho    Charges  THer Ex x2, Ther Act

## 2025-06-23 ENCOUNTER — TELEPHONE (OUTPATIENT)
Dept: PHYSICAL THERAPY | Age: 45
End: 2025-06-23

## 2025-06-30 ENCOUNTER — APPOINTMENT (OUTPATIENT)
Dept: PHYSICAL THERAPY | Age: 45
End: 2025-06-30
Attending: INTERNAL MEDICINE
Payer: COMMERCIAL

## 2025-07-02 ENCOUNTER — APPOINTMENT (OUTPATIENT)
Dept: PHYSICAL THERAPY | Age: 45
End: 2025-07-02
Attending: INTERNAL MEDICINE

## (undated) NOTE — LETTER
Date: 6/4/2025    Patient Name: Syed Sheppard          To Whom it may concern:    This letter has been written at the patient's request. The above patient was seen at [facility] for treatment of a medical condition.    With his permission I am sharing that he has .    The patient may return to work/school on *** with the following limitations ***.        Sincerely,    Christiano Chance MD